# Patient Record
Sex: MALE | Race: WHITE | Employment: OTHER | URBAN - METROPOLITAN AREA
[De-identification: names, ages, dates, MRNs, and addresses within clinical notes are randomized per-mention and may not be internally consistent; named-entity substitution may affect disease eponyms.]

---

## 2017-01-16 ENCOUNTER — APPOINTMENT (OUTPATIENT)
Dept: LAB | Age: 62
End: 2017-01-16
Payer: COMMERCIAL

## 2017-01-16 DIAGNOSIS — Z98.890 HX OF COLONOSCOPY: ICD-10-CM

## 2017-01-18 ENCOUNTER — ANESTHESIA EVENT (OUTPATIENT)
Dept: ENDOSCOPY | Facility: HOSPITAL | Age: 62
End: 2017-01-18
Payer: COMMERCIAL

## 2017-01-18 LAB
P-R INTERVAL: 472 MS
Q-T INTERVAL: 472 MS
QRS DURATION: 198 MS
QTC CALCULATION (BEZET): 472 MS
R AXIS: -75 DEGREES
T AXIS: 80 DEGREES
VENTRICULAR RATE: 60 BPM

## 2017-01-18 PROCEDURE — 93005 ELECTROCARDIOGRAM TRACING: CPT | Performed by: INTERNAL MEDICINE

## 2017-01-18 PROCEDURE — 93010 ELECTROCARDIOGRAM REPORT: CPT | Performed by: INTERNAL MEDICINE

## 2017-01-19 ENCOUNTER — SURGERY (OUTPATIENT)
Age: 62
End: 2017-01-19

## 2017-01-19 ENCOUNTER — HOSPITAL ENCOUNTER (OUTPATIENT)
Facility: HOSPITAL | Age: 62
Setting detail: HOSPITAL OUTPATIENT SURGERY
Discharge: HOME | End: 2017-01-19
Attending: INTERNAL MEDICINE | Admitting: INTERNAL MEDICINE
Payer: COMMERCIAL

## 2017-01-19 ENCOUNTER — ANESTHESIA (OUTPATIENT)
Dept: ENDOSCOPY | Facility: HOSPITAL | Age: 62
End: 2017-01-19
Payer: COMMERCIAL

## 2017-01-19 VITALS
SYSTOLIC BLOOD PRESSURE: 121 MMHG | HEIGHT: 69 IN | WEIGHT: 255 LBS | OXYGEN SATURATION: 98 % | HEART RATE: 59 BPM | RESPIRATION RATE: 13 BRPM | TEMPERATURE: 98 F | DIASTOLIC BLOOD PRESSURE: 78 MMHG | BODY MASS INDEX: 37.77 KG/M2

## 2017-01-19 DIAGNOSIS — Z98.890 HX OF COLONOSCOPY: Primary | ICD-10-CM

## 2017-01-19 LAB — INR: 1.3 (ref 0.8–1.3)

## 2017-01-19 PROCEDURE — 85610 PROTHROMBIN TIME: CPT | Performed by: INTERNAL MEDICINE

## 2017-01-19 PROCEDURE — 88305 TISSUE EXAM BY PATHOLOGIST: CPT | Performed by: INTERNAL MEDICINE

## 2017-01-19 PROCEDURE — 0DBL8ZX EXCISION OF TRANSVERSE COLON, VIA NATURAL OR ARTIFICIAL OPENING ENDOSCOPIC, DIAGNOSTIC: ICD-10-PCS | Performed by: INTERNAL MEDICINE

## 2017-01-19 RX ORDER — NALOXONE HYDROCHLORIDE 0.4 MG/ML
80 INJECTION, SOLUTION INTRAMUSCULAR; INTRAVENOUS; SUBCUTANEOUS AS NEEDED
Status: DISCONTINUED | OUTPATIENT
Start: 2017-01-19 | End: 2017-01-19

## 2017-01-19 RX ORDER — SODIUM CHLORIDE, SODIUM LACTATE, POTASSIUM CHLORIDE, CALCIUM CHLORIDE 600; 310; 30; 20 MG/100ML; MG/100ML; MG/100ML; MG/100ML
INJECTION, SOLUTION INTRAVENOUS CONTINUOUS
Status: DISCONTINUED | OUTPATIENT
Start: 2017-01-19 | End: 2017-01-19

## 2017-01-19 RX ORDER — ONDANSETRON 2 MG/ML
4 INJECTION INTRAMUSCULAR; INTRAVENOUS AS NEEDED
Status: DISCONTINUED | OUTPATIENT
Start: 2017-01-19 | End: 2017-01-19

## 2017-01-19 NOTE — ANESTHESIA POSTPROCEDURE EVALUATION
[id number] [facility] Patient Status:  Hospital Outpatient Surgery   Age/Gender 57year old male MRN [id number]   Location [address] [place] [address]. Attending Tuan Pina MD   Hosp Day # 0 PCP Eder Adrian MD       Anesthesia Post-op No

## 2017-01-19 NOTE — ANESTHESIA PREPROCEDURE EVALUATION
PRE-OP EVALUATION    Patient Name: Jorje Daly    Pre-op Diagnosis: RECALL COLONOSCOPY      Procedure(s):  COLONOSCOPY      Surgeon(s) and Role:     * Tawnya Johnson MD - Primary    Pre-op vitals reviewed. Current medications reviewed. syndrome)  (+) valvular problems/murmurs and MR    (+) dysrhythmias and atrial fibrillation and PVC  (+) CHF                Endo/Other    Negative endo/other ROS.                               Pulmonary                    (+) sleep apnea and no treatment

## 2017-01-19 NOTE — H&P
[facility] Patient Status:  Hospital Outpatient Surgery    1955 MRN [id number]   [facility] ENDOSCOPY Attending Tsering Davis MD   Hosp Day # 0 PCP Orinda Carrel, MD     History of Pr after eating or when upset, automatic defibrillator, angina, other implanted devices, irregular heart rate/palpitations, high cholesterol or triglycerides, coronary stents.   Respiratory: Denies shortness of breath, chronic/frequent hoarseness, wheezing, ex normal S1S2, no murmur. Abdomen: soft, non-tender, non-distended, no masses, no guarding, no     rebound, positive BS. Extremity: no edema, no cyanosis   Skin: No rashes or lesions.     Neurological: Alert, interactive, no focal deficits    ASA Score: 3

## 2017-01-19 NOTE — OPERATIVE REPORT
Colon operative report  Patient Name: Roderick Flores  Procedure: Colonoscopy with snare polypectomy  Indication: Colon cancer screening   Date of last colonoscopy: The patient reports 7 years ago, and does not recall date, or findings.  He is presenting patient tolerated the procedure without apparent procedural complications. The patient left the procedure room in stable condition for recovery. Findings: There were internal hemorrhoids.   There were no masses, fissures, fistulae, or external hemorrhoid

## 2017-01-24 PROBLEM — Z45.018 PACEMAKER END OF LIFE: Status: ACTIVE | Noted: 2017-01-24

## 2017-01-30 ENCOUNTER — HOSPITAL ENCOUNTER (OUTPATIENT)
Dept: INTERVENTIONAL RADIOLOGY/VASCULAR | Facility: HOSPITAL | Age: 62
Discharge: HOME | End: 2017-01-30
Attending: INTERNAL MEDICINE | Admitting: INTERNAL MEDICINE
Payer: COMMERCIAL

## 2017-01-30 VITALS
OXYGEN SATURATION: 96 % | RESPIRATION RATE: 17 BRPM | HEART RATE: 66 BPM | HEIGHT: 69 IN | SYSTOLIC BLOOD PRESSURE: 124 MMHG | WEIGHT: 255 LBS | TEMPERATURE: 97 F | BODY MASS INDEX: 37.77 KG/M2 | DIASTOLIC BLOOD PRESSURE: 71 MMHG

## 2017-01-30 DIAGNOSIS — Z45.010 PACEMAKER AT END OF BATTERY LIFE: ICD-10-CM

## 2017-01-30 LAB — INR: 1 (ref 0.8–1.3)

## 2017-01-30 PROCEDURE — 0JPT0PZ REMOVAL OF CARDIAC RHYTHM RELATED DEVICE FROM TRUNK SUBCUTANEOUS TISSUE AND FASCIA, OPEN APPROACH: ICD-10-PCS | Performed by: INTERNAL MEDICINE

## 2017-01-30 PROCEDURE — 33227 REMOVE&REPLACE PM GEN SINGL: CPT

## 2017-01-30 PROCEDURE — 85610 PROTHROMBIN TIME: CPT

## 2017-01-30 PROCEDURE — 33207 INSERT HEART PM VENTRICULAR: CPT

## 2017-01-30 PROCEDURE — 0JH604Z INSERTION OF PACEMAKER, SINGLE CHAMBER INTO CHEST SUBCUTANEOUS TISSUE AND FASCIA, OPEN APPROACH: ICD-10-PCS | Performed by: INTERNAL MEDICINE

## 2017-01-30 PROCEDURE — 99152 MOD SED SAME PHYS/QHP 5/>YRS: CPT

## 2017-01-30 PROCEDURE — 99153 MOD SED SAME PHYS/QHP EA: CPT

## 2017-01-30 RX ORDER — BACITRACIN 50000 [USP'U]/1
INJECTION, POWDER, LYOPHILIZED, FOR SOLUTION INTRAMUSCULAR
Status: COMPLETED
Start: 2017-01-30 | End: 2017-01-30

## 2017-01-30 RX ORDER — ONDANSETRON 2 MG/ML
4 INJECTION INTRAMUSCULAR; INTRAVENOUS EVERY 6 HOURS PRN
Status: DISCONTINUED | OUTPATIENT
Start: 2017-01-30 | End: 2017-01-30

## 2017-01-30 RX ORDER — MIDAZOLAM HYDROCHLORIDE 1 MG/ML
INJECTION INTRAMUSCULAR; INTRAVENOUS
Status: COMPLETED
Start: 2017-01-30 | End: 2017-01-30

## 2017-01-30 RX ORDER — CEFADROXIL 500 MG/1
500 CAPSULE ORAL 2 TIMES DAILY
Qty: 10 CAPSULE | Refills: 0 | Status: SHIPPED | OUTPATIENT
Start: 2017-01-30 | End: 2017-02-04

## 2017-01-30 RX ORDER — SODIUM CHLORIDE 9 MG/ML
INJECTION, SOLUTION INTRAVENOUS CONTINUOUS
Status: DISCONTINUED | OUTPATIENT
Start: 2017-01-30 | End: 2017-01-30

## 2017-01-30 RX ORDER — CHLORHEXIDINE GLUCONATE 4 G/100ML
30 SOLUTION TOPICAL
Status: COMPLETED | OUTPATIENT
Start: 2017-01-31 | End: 2017-01-30

## 2017-01-30 RX ORDER — LIDOCAINE HYDROCHLORIDE 10 MG/ML
INJECTION, SOLUTION INFILTRATION; PERINEURAL
Status: COMPLETED
Start: 2017-01-30 | End: 2017-01-30

## 2017-01-30 RX ADMIN — SODIUM CHLORIDE: 9 INJECTION, SOLUTION INTRAVENOUS at 07:15:00

## 2017-01-30 RX ADMIN — CHLORHEXIDINE GLUCONATE 30 ML: 4 SOLUTION TOPICAL at 07:36:00

## 2017-01-30 NOTE — PROGRESS NOTES
Pre-procedure Note  Pt with chronic a fib and SSS with perm VVI pacemaker at [facility] for pulse generator exchange. Risk, benefits and alternatives explained to pt who wishes to proceed.

## 2017-01-30 NOTE — PROGRESS NOTES
Patient is s/p pacemaker generator change; a/o x 4; hemodynamically stable/neurologically intact; iv site removed intact; pacer site bandage c/d/i with no bleeding/hematoma noted; verbalized understanding of dc instructions/follow up; discharged in stable

## 2017-01-30 NOTE — PROCEDURES
[facility]    PATIENT'S NAME: Mallory Chew   ATTENDING PHYSICIAN: Elizabeth Browne M.D. OPERATING PHYSICIAN: Elizabeth Browne M.D.    PATIENT ACCOUNT#:   [de-identified]    LOCATION:  [facility] 2 EDWP 10  MEDICAL RECORD #:   [id number]       DATE OF BI medicated bandage. The patient returned to his room in stable and satisfactory condition. The patient received intravenous antibiotics and intravenous sedation monitored by myself. The patient received fentanyl 25 mg, Versed 2 mg x2.   Initial sedation w

## 2017-08-08 ENCOUNTER — APPOINTMENT (OUTPATIENT)
Dept: GENERAL RADIOLOGY | Facility: HOSPITAL | Age: 62
End: 2017-08-08
Attending: EMERGENCY MEDICINE
Payer: COMMERCIAL

## 2017-08-08 ENCOUNTER — HOSPITAL ENCOUNTER (EMERGENCY)
Facility: HOSPITAL | Age: 62
Discharge: HOME | End: 2017-08-08
Attending: EMERGENCY MEDICINE
Payer: COMMERCIAL

## 2017-08-08 VITALS
RESPIRATION RATE: 16 BRPM | OXYGEN SATURATION: 99 % | BODY MASS INDEX: 35.79 KG/M2 | DIASTOLIC BLOOD PRESSURE: 70 MMHG | TEMPERATURE: 99 F | SYSTOLIC BLOOD PRESSURE: 136 MMHG | HEIGHT: 70 IN | HEART RATE: 82 BPM | WEIGHT: 250 LBS

## 2017-08-08 DIAGNOSIS — K59.00 CONSTIPATION, UNSPECIFIED CONSTIPATION TYPE: Primary | ICD-10-CM

## 2017-08-08 PROCEDURE — 99283 EMERGENCY DEPT VISIT LOW MDM: CPT

## 2017-08-08 PROCEDURE — 82272 OCCULT BLD FECES 1-3 TESTS: CPT

## 2017-08-08 PROCEDURE — 74000 XR ABDOMEN (KUB) (1 AP VIEW)  (CPT=74000): CPT | Performed by: EMERGENCY MEDICINE

## 2017-08-08 RX ORDER — BISACODYL 10 MG
10 SUPPOSITORY, RECTAL RECTAL
Status: DISCONTINUED | OUTPATIENT
Start: 2017-08-08 | End: 2017-08-08

## 2017-08-08 RX ORDER — MAGNESIUM CARB/ALUMINUM HYDROX 105-160MG
296 TABLET,CHEWABLE ORAL ONCE
Status: COMPLETED | OUTPATIENT
Start: 2017-08-08 | End: 2017-08-08

## 2017-08-08 NOTE — ED NOTES
DC instructions handed to pt. No distress noted. FAmily at bedside. Pt thanks staff for care. Denies any further needs prior to DC. Water provided.

## 2017-08-08 NOTE — ED PROVIDER NOTES
Patient Seen in: [facility] Emergency Department    History   Patient presents with:  Constipation (gastrointestinal)    Stated Complaint: constipated, bloated    HPI    58-year-old male complaint of constipation the patient states his last problem elements reviewed from today and agreed except as otherwise stated in HPI.     Physical Exam   ED Triage Vitals [08/08/17 0822]  BP: 146/80  Pulse: 95  Resp: 18  Temp: 98.8 °F (37.1 °C)  Temp src: Temporal  SpO2: 97 %  O2 Device: None (Room air)    Current:

## 2017-08-08 NOTE — ED NOTES
Pt updated on poc. No distress noted. Pt denies any needs at this time.    Denies any BM at this time

## 2021-01-26 PROBLEM — M70.62 TROCHANTERIC BURSITIS, LEFT HIP: Status: ACTIVE | Noted: 2018-12-14

## 2021-01-26 PROBLEM — M25.552 PAIN IN JOINT OF LEFT HIP: Status: ACTIVE | Noted: 2018-12-14

## 2021-01-26 PROBLEM — E78.2 MIXED HYPERLIPIDEMIA: Status: ACTIVE | Noted: 2021-01-26

## 2021-04-11 DIAGNOSIS — Z23 NEED FOR VACCINATION: ICD-10-CM

## 2021-06-30 ENCOUNTER — ORDER TRANSCRIPTION (OUTPATIENT)
Dept: ADMINISTRATIVE | Facility: HOSPITAL | Age: 66
End: 2021-06-30

## 2021-06-30 DIAGNOSIS — Z79.01 ANTICOAGULATED ON COUMADIN: ICD-10-CM

## 2021-06-30 DIAGNOSIS — E78.00 HYPERCHOLESTEREMIA: ICD-10-CM

## 2021-06-30 DIAGNOSIS — Z95.0 PACEMAKER: ICD-10-CM

## 2021-06-30 DIAGNOSIS — I48.20 CHRONIC ATRIAL FIBRILLATION (HCC): Primary | ICD-10-CM

## 2021-07-17 ENCOUNTER — HOSPITAL ENCOUNTER (OUTPATIENT)
Dept: CT IMAGING | Facility: HOSPITAL | Age: 66
Discharge: HOME | End: 2021-07-17
Attending: INTERNAL MEDICINE

## 2021-07-17 DIAGNOSIS — E78.00 HYPERCHOLESTEREMIA: ICD-10-CM

## 2021-07-17 DIAGNOSIS — Z95.0 PACEMAKER: ICD-10-CM

## 2021-07-17 DIAGNOSIS — Z79.01 ANTICOAGULATED ON COUMADIN: ICD-10-CM

## 2021-07-17 DIAGNOSIS — I48.20 CHRONIC ATRIAL FIBRILLATION (HCC): ICD-10-CM

## 2021-07-17 DIAGNOSIS — Z13.6 SCREENING FOR CARDIOVASCULAR CONDITION: ICD-10-CM

## 2021-08-20 NOTE — PROGRESS NOTES
My chart message 7/30 and TE 8/6    Future Appointments  8/24/2021  1:30 PM    [facility]        [facility]              [address]  9/22/2021  2:15 PM     [address]

## 2024-01-31 NOTE — H&P (VIEW-ONLY)
HPI:   Chuck Goodrich is a 68 year old male who presents for a pre-operative physical exam. Patient is to have TURP (Saline), bladder Stone lithopaxy, Possible SP tube placement , to be done by Dr. Evin Sosa at [facility] on 2/15/24.      Pt complains of urinary issues, hematuria.    Revised Cardiac Risk Index (modified Lema Index):   High-risk surgical procedure (intraperitoneal, intrathoracic, suprainguinal vascular) No   History of MI or positive stress test, current chest pain secondary to myocardiac ischemia, current nitrate therapy, or EKG with pathologic Q wave No   History of CHF, pulmonary edema, PND, current rales, S3 gallop, chest xray with pulmonary vascular redistribution Yes   History of CVA/TIA No   Preoperative treatment with insulin No   Preoperative creatinine > 2.0 No   Score 1     Risk of major cardiac complication: moderate    Patient reports no:  Chest pain  Chest discomfort  Jaw pain  Jaw discomfort  Arm pain   Arm discomfort  Shortness of breath   Dyspnea on exertion  Paroxysmal nocturnal dyspnea  Orthopnea  Palpations  Edema    Previous hx of cardiac or vascular issues are CHF, Atrial fibrillation    Current Outpatient Medications   Medication Sig Dispense Refill   • fenofibrate 145 MG Oral Tab Take 1 tablet (145 mg total) by mouth daily. 90 tablet 3   • tamsulosin (Flomax) cap Take 1 capsule (0.4 mg total) by mouth 2 (two) times a day. 60 capsule 2   • levoFLOXacin (LEVAQUIN) 500 MG Oral Tab Take 1 tablet (500 mg total) by mouth daily. 7 tablet 0   • tamsulosin (Flomax) cap TAKE 1 CAPSULE BY MOUTH EVERY DAY 90 capsule 3   • verapamil  MG Oral Tab CR Take 1 tablet (240 mg total) by mouth 2 (two) times daily. 180 tablet 3   • metoprolol tartrate 100 MG Oral Tab TAKE 1 TABLET BY MOUTH every morning and AND 0.5 tablet every evening 135 tablet 3   • warfarin 2 MG Oral Tab Take 1-tablet (2mg) on Fridays and take 2-tablets (4mg) other 6-days of week or as directed 180  tablet 3   • Mirabegron ER (MYRBETRIQ) 25 MG Oral Tablet 24 Hr Take 1 tablet (25 mg total) by mouth daily. (Patient not taking: Reported on 1/31/2024) 30 tablet 3   • rosuvastatin (CRESTOR) 10 MG Oral Tab Take 1 tablet (10 mg total) by mouth daily. (Patient not taking: Reported on 1/31/2024) 90 tablet 3      Allergies: No Known Allergies   Past Medical History:   Diagnosis Date   • Atrial fibrillation (HCC)    • CHF (congestive heart failure) (HCC)    • Hypertension    • Long term (current) use of anticoagulants    • Mild mitral regurgitation    • ORI (obstructive sleep apnea)       Past Surgical History:   Procedure Laterality Date   • CARDIAC PACEMAKER PLACEMENT      [employer]   • COLONOSCOPY N/A 1/19/2017    Procedure: COLONOSCOPY;  Surgeon: Fidel Cuenca MD;  Location:  ENDOSCOPY   • OTHER SURGICAL HISTORY      Pacemaker      Family History   Problem Relation Age of Onset   • Cancer Father         Esophageal   • Other (Other) Mother         Heart Failer.      Social History: Social History    Tobacco Use      Smoking status: Never      Smokeless tobacco: Never    Alcohol use: Yes      Alcohol/week: 0.0 standard drinks of alcohol      Comment: 2-3 per week    Drug use: No         REVIEW OF SYSTEMS:   GENERAL: feels well otherwise  SKIN: denies any unusual skin lesions  EYES: denies blurred vision or double vision  HEENT: denies URI symptoms  LUNGS: see HPI  CARDIOVASCULAR: see HPI  GI: denies diarrhea  : hematuria  MUSCULOSKELETAL: good rom  PSYCHE: denies depression or anxiety  HEMATOLOGIC: denies hx of anemia  ENDOCRINE: denies thyroid history  ALL/ASTHMA: denies hx of allergy or asthma    EXAM:   There were no vitals taken for this visit.  GENERAL: well developed, well nourished, in no apparent distress  SKIN: no rashes, no suspicious lesions  HEENT: atraumatic, normocephalic, ears and throat are clear  EYES: PERRL, EOMI, normal conjunctiva, anicteric  NECK: supple, no adenopathy, no bruits  LUNGS:  clear to auscultation  CARDIO: RRR without murmur  GI: good BS's, no masses, HSM or tenderness  : deferred  MUSCULOSKELETAL: good rum  EXTREMITIES: no cyanosis, clubbing or edema  NEURO: A &O x 3, CN intact, motor and sensory are grossly intact    Ek bpm Afib w occ Ventricular paced complexes    ASSESSMENT AND PLAN:     Chuck Goodrich is a 68 year old male who presents for a pre-operative physical exam. Patient is to have TURP (Saline), bladder Stone lithopaxy, Possible SP tube placement , to be done by Dr. Evin Sosa at  on 2/15/24. Pt has the following conditions: chf, afib. Pt has  significant history of cardiac or pulmonary conditions, will need inhospital management of anticoagulation either through hospitalist or cardiology, will place referral for cardiology to decide on best approach.  Patient is cleared for surgery with the risks of the procedure and anesthesia as discussed with those specific specialists.    This consult was sent back the referring physician, Dr. Sosa.

## 2024-02-10 RX ORDER — FENOFIBRATE 145 MG/1
145 TABLET, COATED ORAL DAILY
COMMUNITY
Start: 2023-12-14 | End: 2024-12-08

## 2024-02-10 RX ORDER — TAMSULOSIN HYDROCHLORIDE 0.4 MG/1
1 CAPSULE ORAL DAILY
COMMUNITY
Start: 2023-04-05

## 2024-02-14 ENCOUNTER — ANESTHESIA EVENT (OUTPATIENT)
Dept: SURGERY | Facility: HOSPITAL | Age: 69
End: 2024-02-14
Payer: MEDICARE

## 2024-02-14 NOTE — DISCHARGE INSTRUCTIONS
HOME INSTRUCTIONS    Norco  for pain   Bactrim 2 x  a day for 5 days   Ditropan XL  10 mg a day as needed for Llamas discomfort   Drink at least 4-6 glasses of water a day  No strenuous activity   x 2 weeks   Avoid constipation   Follow up with Dr Sosa on Tuesday at  930  AM  [place] office -- will take both tubes out    Keep SP tube in abdomen capped    Llamas in penis  to leg bag when out of the house.  When in the house connect to the large night bag.      OK   to shower  Saturday morning     The medication that you received for sedation or general anesthesia can last up to 24 hours. Your judgment and reflexes may be altered, even if you feel like your normal self.      We Recommend:   Do not drive any motor vehicle or bicycle   Avoid mowing the lawn, playing sports, or working with power tools/applicances (power saws, electric knives or mixers)   That you have someone stay with you on your first night home   Do not drink alcohol or take sleeping pills or tranquilizers   Do not sign legal documents within 24 hours of your procedure   If you had a nerve block for your surgery, take extra care not to put any pressure on your arm or hand for 24 hours    It is normal:  For you to have a sore throat if you had a breathing tube during surgery (while you were asleep!). The sore throat should get better within 48 hours. You can gargle with warm salt water (1/2 tsp in 4 oz warm water) or use a throat lozenge for comfort  To feel muscle aches or soreness especially in the abdomen, chest or neck. The achy feeling should go away in the next 24 hours  To feel weak, sleepy or \"wiped out\". Your should start feeling better in the next 24 hours.   To experience mild discomforts such as sore lip or tongue, headache, cramps, gas pains or a bloated feeling in your abdomen.   To experience mild back pain or soreness for a day or two if you had spinal or epidural anesthesia.   If you had laparoscopic surgery, to feel shoulder pain or  discomfort on the day of surgery.   For some patients to have nausea after surgery/anesthesia    If you feel nausea or experience vomiting:   Try to move around less.   Eat less than usual or drink only liquids until the next morning   Nausea should resolve in about 24 hours    If you have a problem when you are at home:    Call your surgeons office   Discharge Instructions: After Your Surgery  You’ve just had surgery. During surgery, you were given medicine called anesthesia to keep you relaxed and free of pain. After surgery, you may have some pain or nausea. This is common. Here are some tips for feeling better and getting well after surgery.   Going home  Your healthcare provider will show you how to take care of yourself when you go home. They'll also answer your questions. Have an adult family member or friend drive you home. For the first 24 hours after your surgery:   Don't drive or use heavy equipment.  Don't make important decisions or sign legal papers.  Take medicines as directed.  Don't drink alcohol.  Have someone stay with you, if needed. They can watch for problems and help keep you safe.  Be sure to go to all follow-up visits with your healthcare provider. And rest after your surgery for as long as your provider tells you to.   Coping with pain  If you have pain after surgery, pain medicine will help you feel better. Take it as directed, before pain becomes severe. Also, ask your healthcare provider or pharmacist about other ways to control pain. This might be with heat, ice, or relaxation. And follow any other instructions your surgeon or nurse gives you.      Stay on schedule with your medicine.     Tips for taking pain medicine  To get the best relief possible, remember these points:   Pain medicines can upset your stomach. Taking them with a little food may help.  Most pain relievers taken by mouth need at least 20 to 30 minutes to start to work.  Don't wait till your pain becomes severe before  you take your medicine. Try to time your medicine so that you can take it before starting an activity. This might be before you get dressed, go for a walk, or sit down for dinner.  Constipation is a common side effect of some pain medicines. Call your healthcare provider before taking any medicines such as laxatives or stool softeners to help ease constipation. Also ask if you should skip any foods. Drinking lots of fluids and eating foods such as fruits and vegetables that are high in fiber can also help. Remember, don't take laxatives unless your surgeon has prescribed them.  Drinking alcohol and taking pain medicine can cause dizziness and slow your breathing. It can even be deadly. Don't drink alcohol while taking pain medicine.  Pain medicine can make you react more slowly to things. Don't drive or run machinery while taking pain medicine.  Your healthcare provider may tell you to take acetaminophen to help ease your pain. Ask them how much you're supposed to take each day. Acetaminophen or other pain relievers may interact with your prescription medicines or other over-the-counter (OTC) medicines. Some prescription medicines have acetaminophen and other ingredients in them. Using both prescription and OTC acetaminophen for pain can cause you to accidentally overdose. Read the labels on your OTC medicines with care. This will help you to clearly know the list of ingredients, how much to take, and any warnings. It may also help you not take too much acetaminophen. If you have questions or don't understand the information, ask your pharmacist or healthcare provider to explain it to you before you take the OTC medicine.   Managing nausea  Some people have an upset stomach (nausea) after surgery. This is often because of anesthesia, pain, or pain medicine, less movement of food in the stomach, or the stress of surgery. These tips will help you handle nausea and eat healthy foods as you get better. If you were on a  special food plan before surgery, ask your healthcare provider if you should follow it while you get better. Check with your provider on how your eating should progress. It may depend on the surgery you had. These general tips may help:   Don't push yourself to eat. Your body will tell you when to eat and how much.  Start off with clear liquids and soup. They're easier to digest.  Next try semi-solid foods as you feel ready. These include mashed potatoes, applesauce, and gelatin.  Slowly move to solid foods. Don’t eat fatty, rich, or spicy foods at first.  Don't force yourself to have 3 large meals a day. Instead eat smaller amounts more often.  Take pain medicines with a small amount of solid food, such as crackers or toast. This helps prevent nausea.  When to call your healthcare provider  Call your healthcare provider right away if you have any of these:   You still have too much pain, or the pain gets worse, after taking the medicine. The medicine may not be strong enough. Or there may be a complication from the surgery.  You feel too sleepy, dizzy, or groggy. The medicine may be too strong.  Side effects such as nausea or vomiting. Your healthcare provider may advise taking other medicines to .  Skin changes such as rash, itching, or hives. This may mean you have an allergic reaction. Your provider may advise taking other medicines.  The incision looks different (for instance, part of it opens up).  Bleeding or fluid leaking from the incision site, and weren't told to expect that.  Fever of 100.4°F (38°C) or higher, or as directed by your provider.  Call 911  Call 911 right away if you have:   Trouble breathing  Facial swelling    If you have obstructive sleep apnea   You were given anesthesia medicine during surgery to keep you comfortable and free of pain. After surgery, you may have more apnea spells because of this medicine and other medicines you were given. The spells may last longer than normal.    At  home:  Keep using the continuous positive airway pressure (CPAP) device when you sleep. Unless your healthcare provider tells you not to, use it when you sleep, day or night. CPAP is a common device used to treat obstructive sleep apnea.  Talk with your provider before taking any pain medicine, muscle relaxants, or sedatives. Your provider will tell you about the possible dangers of taking these medicines.  Contact your provider if your sleeping changes a lot even when taking medicines as directed.  [employer] last reviewed this educational content on 10/1/2021  © 4201-0243 [employer]. All rights reserved. This information is not intended as a substitute for professional medical care. Always follow your healthcare professional's instructions.       HOME INSTRUCTIONS  [facility] HOME CARE INSTRUCTIONS: POST-OP ANESTHESIA  The medication that you received for sedation or general anesthesia can last up to 24 hours. Your judgment and reflexes may be altered, even if you feel like your normal self.      We Recommend:   Do not drive any motor vehicle or bicycle   Avoid mowing the lawn, playing sports, or working with power tools/applicances (power saws, electric knives or mixers)   That you have someone stay with you on your first night home   Do not drink alcohol or take sleeping pills or tranquilizers   Do not sign legal documents within 24 hours of your procedure   If you had a nerve block for your surgery, take extra care not to put any pressure on your arm or hand for 24 hours    It is normal:  For you to have a sore throat if you had a breathing tube during surgery (while you were asleep!). The sore throat should get better within 48 hours. You can gargle with warm salt water (1/2 tsp in 4 oz warm water) or use a throat lozenge for comfort  To feel muscle aches or soreness especially in the abdomen, chest or neck. The achy feeling should go away in the next 24 hours  To feel weak, sleepy or \"wiped out\".  Your should start feeling better in the next 24 hours.   To experience mild discomforts such as sore lip or tongue, headache, cramps, gas pains or a bloated feeling in your abdomen.   To experience mild back pain or soreness for a day or two if you had spinal or epidural anesthesia.   If you had laparoscopic surgery, to feel shoulder pain or discomfort on the day of surgery.   For some patients to have nausea after surgery/anesthesia    If you feel nausea or experience vomiting:   Try to move around less.   Eat less than usual or drink only liquids until the next morning   Nausea should resolve in about 24 hours    If you have a problem when you are at home:    Call your surgeons office   Discharge Instructions: After Your Surgery  You’ve just had surgery. During surgery, you were given medicine called anesthesia to keep you relaxed and free of pain. After surgery, you may have some pain or nausea. This is common. Here are some tips for feeling better and getting well after surgery.   Going home  Your healthcare provider will show you how to take care of yourself when you go home. They'll also answer your questions. Have an adult family member or friend drive you home. For the first 24 hours after your surgery:   Don't drive or use heavy equipment.  Don't make important decisions or sign legal papers.  Take medicines as directed.  Don't drink alcohol.  Have someone stay with you, if needed. They can watch for problems and help keep you safe.  Be sure to go to all follow-up visits with your healthcare provider. And rest after your surgery for as long as your provider tells you to.   Coping with pain  If you have pain after surgery, pain medicine will help you feel better. Take it as directed, before pain becomes severe. Also, ask your healthcare provider or pharmacist about other ways to control pain. This might be with heat, ice, or relaxation. And follow any other instructions your surgeon or nurse gives you.       Stay on schedule with your medicine.     Tips for taking pain medicine  To get the best relief possible, remember these points:   Pain medicines can upset your stomach. Taking them with a little food may help.  Most pain relievers taken by mouth need at least 20 to 30 minutes to start to work.  Don't wait till your pain becomes severe before you take your medicine. Try to time your medicine so that you can take it before starting an activity. This might be before you get dressed, go for a walk, or sit down for dinner.  Constipation is a common side effect of some pain medicines. Call your healthcare provider before taking any medicines such as laxatives or stool softeners to help ease constipation. Also ask if you should skip any foods. Drinking lots of fluids and eating foods such as fruits and vegetables that are high in fiber can also help. Remember, don't take laxatives unless your surgeon has prescribed them.  Drinking alcohol and taking pain medicine can cause dizziness and slow your breathing. It can even be deadly. Don't drink alcohol while taking pain medicine.  Pain medicine can make you react more slowly to things. Don't drive or run machinery while taking pain medicine.  Your healthcare provider may tell you to take acetaminophen to help ease your pain. Ask them how much you're supposed to take each day. Acetaminophen or other pain relievers may interact with your prescription medicines or other over-the-counter (OTC) medicines. Some prescription medicines have acetaminophen and other ingredients in them. Using both prescription and OTC acetaminophen for pain can cause you to accidentally overdose. Read the labels on your OTC medicines with care. This will help you to clearly know the list of ingredients, how much to take, and any warnings. It may also help you not take too much acetaminophen. If you have questions or don't understand the information, ask your pharmacist or healthcare provider to  explain it to you before you take the OTC medicine.   Managing nausea  Some people have an upset stomach (nausea) after surgery. This is often because of anesthesia, pain, or pain medicine, less movement of food in the stomach, or the stress of surgery. These tips will help you handle nausea and eat healthy foods as you get better. If you were on a special food plan before surgery, ask your healthcare provider if you should follow it while you get better. Check with your provider on how your eating should progress. It may depend on the surgery you had. These general tips may help:   Don't push yourself to eat. Your body will tell you when to eat and how much.  Start off with clear liquids and soup. They're easier to digest.  Next try semi-solid foods as you feel ready. These include mashed potatoes, applesauce, and gelatin.  Slowly move to solid foods. Don’t eat fatty, rich, or spicy foods at first.  Don't force yourself to have 3 large meals a day. Instead eat smaller amounts more often.  Take pain medicines with a small amount of solid food, such as crackers or toast. This helps prevent nausea.  When to call your healthcare provider  Call your healthcare provider right away if you have any of these:   You still have too much pain, or the pain gets worse, after taking the medicine. The medicine may not be strong enough. Or there may be a complication from the surgery.  You feel too sleepy, dizzy, or groggy. The medicine may be too strong.  Side effects such as nausea or vomiting. Your healthcare provider may advise taking other medicines to .  Skin changes such as rash, itching, or hives. This may mean you have an allergic reaction. Your provider may advise taking other medicines.  The incision looks different (for instance, part of it opens up).  Bleeding or fluid leaking from the incision site, and weren't told to expect that.  Fever of 100.4°F (38°C) or higher, or as directed by your provider.  Call 911  Call  911 right away if you have:   Trouble breathing  Facial swelling    If you have obstructive sleep apnea   You were given anesthesia medicine during surgery to keep you comfortable and free of pain. After surgery, you may have more apnea spells because of this medicine and other medicines you were given. The spells may last longer than normal.    At home:  Keep using the continuous positive airway pressure (CPAP) device when you sleep. Unless your healthcare provider tells you not to, use it when you sleep, day or night. CPAP is a common device used to treat obstructive sleep apnea.  Talk with your provider before taking any pain medicine, muscle relaxants, or sedatives. Your provider will tell you about the possible dangers of taking these medicines.  Contact your provider if your sleeping changes a lot even when taking medicines as directed.  [employer] last reviewed this educational content on 10/1/2021  © 3899-4038 [employer]. All rights reserved. This information is not intended as a substitute for professional medical care. Always follow your healthcare professional's instructions.        Discharge Instructions: Caring for Your Indwelling Urinary Catheter  You have been discharged with an indwelling urinary catheter. It's also called a Llamas catheter. A catheter is a thin, flexible tube. An indwelling urinary catheter has 2 parts. The first part is a tube that drains urine from your bladder. The second part is a bag or other device that collects the urine.   The most important thing to remember is that you want to prevent infection. Always wash your hands before handling your catheter bag or tubing.   Draining the bedside bag  Wash your hands with soap and clean, running water. Or use an alcohol-based hand  that contains at least 60% alcohol.  Hold the drainage tube over a toilet or measuring container.  Unclamp the tube and let the bag drain.  Don’t touch the tip of the drainage tube or  let it touch the toilet or container.  You don't need to rinse the bag or drainage tube.    Cleaning the drainage tube  When the bag is empty, clean the tip of the drainage tube with an alcohol wipe.  Clamp the tube.  Reinsert the tube into the pocket on the drainage bag.    Cleaning your skin and tubing  Clean the skin near the catheter with soap and water.  Wash your genital area from front to back.  Wash the catheter tubing. Always wash the catheter in the direction away from your body.  You will be told when and how to change your bag and tubing.  Don’t try to remove the catheter by yourself.  You may shower with the catheter in place.    Emptying a leg bag  Wash your hands.  Remove the stopper on the bag.  Drain the bag into the toilet or a measuring container. Don’t let the tip of the drainage tube touch anything, including your fingers.  Clean the tip of the drainage tube with alcohol.  Replace the stopper.    Follow-up care  Make a follow-up appointment, or as directed by your healthcare provider.   When to call your healthcare provider  Call your healthcare provider right away if you have any of the following:   Fever of 100.4°F ( 38°C) or higher, or as directed by your provider  Chills  Leakage around the catheter insertion site  Increased spasms (uncontrollable twitching) in your legs, belly (abdomen), or bladder. Occasional mild spasms are normal.  Burning in the urinary tract, penis, or genital area  Nausea and vomiting  Aching in the lower back  Cloudy or bloody (pink or red) urine, sediment or mucus in the urine, or bad-smelling urine  [employer] last reviewed this educational content on 9/1/2022 © 2000-2023 [employer]. All rights reserved. This information is not intended as a substitute for professional medical care. Always follow your healthcare professional's instructions.

## 2024-02-15 ENCOUNTER — APPOINTMENT (OUTPATIENT)
Dept: GENERAL RADIOLOGY | Facility: HOSPITAL | Age: 69
End: 2024-02-15
Attending: UROLOGY
Payer: MEDICARE

## 2024-02-15 ENCOUNTER — HOSPITAL ENCOUNTER (OUTPATIENT)
Facility: HOSPITAL | Age: 69
Setting detail: HOSPITAL OUTPATIENT SURGERY
Discharge: HOME | End: 2024-02-15
Attending: UROLOGY | Admitting: UROLOGY
Payer: MEDICARE

## 2024-02-15 ENCOUNTER — ANESTHESIA (OUTPATIENT)
Dept: SURGERY | Facility: HOSPITAL | Age: 69
End: 2024-02-15
Payer: MEDICARE

## 2024-02-15 VITALS
OXYGEN SATURATION: 96 % | SYSTOLIC BLOOD PRESSURE: 122 MMHG | WEIGHT: 284 LBS | BODY MASS INDEX: 42.06 KG/M2 | HEIGHT: 69 IN | DIASTOLIC BLOOD PRESSURE: 76 MMHG | RESPIRATION RATE: 16 BRPM | TEMPERATURE: 98 F | HEART RATE: 66 BPM

## 2024-02-15 DIAGNOSIS — N21.0 BLADDER STONE: Primary | ICD-10-CM

## 2024-02-15 LAB
INR BLD: 1.03 (ref 0.8–1.2)
PROTHROMBIN TIME: 14.1 SECONDS (ref 11.6–14.8)

## 2024-02-15 PROCEDURE — 82365 CALCULUS SPECTROSCOPY: CPT | Performed by: UROLOGY

## 2024-02-15 PROCEDURE — 0TCB3ZZ EXTIRPATION OF MATTER FROM BLADDER, PERCUTANEOUS APPROACH: ICD-10-PCS | Performed by: UROLOGY

## 2024-02-15 PROCEDURE — 0VB08ZX EXCISION OF PROSTATE, VIA NATURAL OR ARTIFICIAL OPENING ENDOSCOPIC, DIAGNOSTIC: ICD-10-PCS | Performed by: UROLOGY

## 2024-02-15 PROCEDURE — 85610 PROTHROMBIN TIME: CPT | Performed by: UROLOGY

## 2024-02-15 PROCEDURE — 88300 SURGICAL PATH GROSS: CPT | Performed by: UROLOGY

## 2024-02-15 PROCEDURE — 88305 TISSUE EXAM BY PATHOLOGIST: CPT | Performed by: UROLOGY

## 2024-02-15 RX ORDER — HYDROCODONE BITARTRATE AND ACETAMINOPHEN 5; 325 MG/1; MG/1
1-2 TABLET ORAL EVERY 4 HOURS PRN
Qty: 15 TABLET | Refills: 0 | Status: SHIPPED | OUTPATIENT
Start: 2024-02-15

## 2024-02-15 RX ORDER — FAMOTIDINE 10 MG/ML
20 INJECTION, SOLUTION INTRAVENOUS ONCE
Status: COMPLETED | OUTPATIENT
Start: 2024-02-15 | End: 2024-02-15

## 2024-02-15 RX ORDER — SODIUM CHLORIDE, SODIUM LACTATE, POTASSIUM CHLORIDE, CALCIUM CHLORIDE 600; 310; 30; 20 MG/100ML; MG/100ML; MG/100ML; MG/100ML
INJECTION, SOLUTION INTRAVENOUS CONTINUOUS
Status: DISCONTINUED | OUTPATIENT
Start: 2024-02-15 | End: 2024-02-15

## 2024-02-15 RX ORDER — METOCLOPRAMIDE HYDROCHLORIDE 5 MG/ML
10 INJECTION INTRAMUSCULAR; INTRAVENOUS EVERY 8 HOURS PRN
Status: DISCONTINUED | OUTPATIENT
Start: 2024-02-15 | End: 2024-02-15

## 2024-02-15 RX ORDER — FAMOTIDINE 20 MG/1
20 TABLET, FILM COATED ORAL ONCE
Status: COMPLETED | OUTPATIENT
Start: 2024-02-15 | End: 2024-02-15

## 2024-02-15 RX ORDER — MORPHINE SULFATE 10 MG/ML
6 INJECTION, SOLUTION INTRAMUSCULAR; INTRAVENOUS EVERY 10 MIN PRN
Status: DISCONTINUED | OUTPATIENT
Start: 2024-02-15 | End: 2024-02-15

## 2024-02-15 RX ORDER — MORPHINE SULFATE 4 MG/ML
2 INJECTION, SOLUTION INTRAMUSCULAR; INTRAVENOUS EVERY 10 MIN PRN
Status: DISCONTINUED | OUTPATIENT
Start: 2024-02-15 | End: 2024-02-15

## 2024-02-15 RX ORDER — SULFAMETHOXAZOLE AND TRIMETHOPRIM 800; 160 MG/1; MG/1
1 TABLET ORAL 2 TIMES DAILY
Qty: 14 TABLET | Refills: 0 | Status: SHIPPED | OUTPATIENT
Start: 2024-02-15 | End: 2024-02-22

## 2024-02-15 RX ORDER — MORPHINE SULFATE 4 MG/ML
4 INJECTION, SOLUTION INTRAMUSCULAR; INTRAVENOUS EVERY 10 MIN PRN
Status: DISCONTINUED | OUTPATIENT
Start: 2024-02-15 | End: 2024-02-15

## 2024-02-15 RX ORDER — METOCLOPRAMIDE 10 MG/1
10 TABLET ORAL ONCE
Status: COMPLETED | OUTPATIENT
Start: 2024-02-15 | End: 2024-02-15

## 2024-02-15 RX ORDER — CEFAZOLIN SODIUM/WATER 2 G/20 ML
2 SYRINGE (ML) INTRAVENOUS ONCE
Status: COMPLETED | OUTPATIENT
Start: 2024-02-15 | End: 2024-02-15

## 2024-02-15 RX ORDER — ONDANSETRON 2 MG/ML
4 INJECTION INTRAMUSCULAR; INTRAVENOUS EVERY 6 HOURS PRN
Status: DISCONTINUED | OUTPATIENT
Start: 2024-02-15 | End: 2024-02-15

## 2024-02-15 RX ORDER — ACETAMINOPHEN 500 MG
1000 TABLET ORAL ONCE
Status: COMPLETED | OUTPATIENT
Start: 2024-02-15 | End: 2024-02-15

## 2024-02-15 RX ORDER — HYDROMORPHONE HYDROCHLORIDE 1 MG/ML
0.2 INJECTION, SOLUTION INTRAMUSCULAR; INTRAVENOUS; SUBCUTANEOUS EVERY 5 MIN PRN
Status: DISCONTINUED | OUTPATIENT
Start: 2024-02-15 | End: 2024-02-15

## 2024-02-15 RX ORDER — HYDROMORPHONE HYDROCHLORIDE 1 MG/ML
0.6 INJECTION, SOLUTION INTRAMUSCULAR; INTRAVENOUS; SUBCUTANEOUS EVERY 5 MIN PRN
Status: DISCONTINUED | OUTPATIENT
Start: 2024-02-15 | End: 2024-02-15

## 2024-02-15 RX ORDER — OXYBUTYNIN CHLORIDE 10 MG/1
10 TABLET, EXTENDED RELEASE ORAL DAILY PRN
Qty: 5 TABLET | Refills: 0 | Status: SHIPPED | OUTPATIENT
Start: 2024-02-15 | End: 2024-02-20

## 2024-02-15 RX ORDER — LIDOCAINE HYDROCHLORIDE 10 MG/ML
INJECTION, SOLUTION EPIDURAL; INFILTRATION; INTRACAUDAL; PERINEURAL AS NEEDED
Status: DISCONTINUED | OUTPATIENT
Start: 2024-02-15 | End: 2024-02-15 | Stop reason: SURG

## 2024-02-15 RX ORDER — ROCURONIUM BROMIDE 10 MG/ML
INJECTION, SOLUTION INTRAVENOUS AS NEEDED
Status: DISCONTINUED | OUTPATIENT
Start: 2024-02-15 | End: 2024-02-15 | Stop reason: SURG

## 2024-02-15 RX ORDER — PHENYLEPHRINE HCL 10 MG/ML
VIAL (ML) INJECTION AS NEEDED
Status: DISCONTINUED | OUTPATIENT
Start: 2024-02-15 | End: 2024-02-15 | Stop reason: SURG

## 2024-02-15 RX ORDER — HYDROMORPHONE HYDROCHLORIDE 1 MG/ML
0.4 INJECTION, SOLUTION INTRAMUSCULAR; INTRAVENOUS; SUBCUTANEOUS EVERY 5 MIN PRN
Status: DISCONTINUED | OUTPATIENT
Start: 2024-02-15 | End: 2024-02-15

## 2024-02-15 RX ORDER — EPHEDRINE SULFATE 50 MG/ML
INJECTION, SOLUTION INTRAVENOUS AS NEEDED
Status: DISCONTINUED | OUTPATIENT
Start: 2024-02-15 | End: 2024-02-15 | Stop reason: SURG

## 2024-02-15 RX ORDER — DEXAMETHASONE SODIUM PHOSPHATE 4 MG/ML
VIAL (ML) INJECTION AS NEEDED
Status: DISCONTINUED | OUTPATIENT
Start: 2024-02-15 | End: 2024-02-15 | Stop reason: SURG

## 2024-02-15 RX ORDER — METOCLOPRAMIDE HYDROCHLORIDE 5 MG/ML
10 INJECTION INTRAMUSCULAR; INTRAVENOUS ONCE
Status: COMPLETED | OUTPATIENT
Start: 2024-02-15 | End: 2024-02-15

## 2024-02-15 RX ORDER — NALOXONE HYDROCHLORIDE 0.4 MG/ML
0.08 INJECTION, SOLUTION INTRAMUSCULAR; INTRAVENOUS; SUBCUTANEOUS AS NEEDED
Status: DISCONTINUED | OUTPATIENT
Start: 2024-02-15 | End: 2024-02-15

## 2024-02-15 RX ORDER — ONDANSETRON 2 MG/ML
INJECTION INTRAMUSCULAR; INTRAVENOUS AS NEEDED
Status: DISCONTINUED | OUTPATIENT
Start: 2024-02-15 | End: 2024-02-15 | Stop reason: SURG

## 2024-02-15 RX ORDER — HYDROCODONE BITARTRATE AND ACETAMINOPHEN 5; 325 MG/1; MG/1
1 TABLET ORAL ONCE AS NEEDED
Status: COMPLETED | OUTPATIENT
Start: 2024-02-15 | End: 2024-02-15

## 2024-02-15 RX ADMIN — LIDOCAINE HYDROCHLORIDE 50 MG: 10 INJECTION, SOLUTION EPIDURAL; INFILTRATION; INTRACAUDAL; PERINEURAL at 09:01:00

## 2024-02-15 RX ADMIN — ROCURONIUM BROMIDE 30 MG: 10 INJECTION, SOLUTION INTRAVENOUS at 10:09:00

## 2024-02-15 RX ADMIN — ONDANSETRON 4 MG: 2 INJECTION INTRAMUSCULAR; INTRAVENOUS at 10:54:00

## 2024-02-15 RX ADMIN — CEFAZOLIN SODIUM/WATER 2 G: 2 G/20 ML SYRINGE (ML) INTRAVENOUS at 09:10:00

## 2024-02-15 RX ADMIN — EPHEDRINE SULFATE 5 MG: 50 INJECTION, SOLUTION INTRAVENOUS at 10:02:00

## 2024-02-15 RX ADMIN — ROCURONIUM BROMIDE 70 MG: 10 INJECTION, SOLUTION INTRAVENOUS at 09:01:00

## 2024-02-15 RX ADMIN — ROCURONIUM BROMIDE 20 MG: 10 INJECTION, SOLUTION INTRAVENOUS at 10:43:00

## 2024-02-15 RX ADMIN — SODIUM CHLORIDE, SODIUM LACTATE, POTASSIUM CHLORIDE, CALCIUM CHLORIDE: 600; 310; 30; 20 INJECTION, SOLUTION INTRAVENOUS at 08:53:00

## 2024-02-15 RX ADMIN — PHENYLEPHRINE HCL 100 MCG: 10 MG/ML VIAL (ML) INJECTION at 10:02:00

## 2024-02-15 RX ADMIN — DEXAMETHASONE SODIUM PHOSPHATE 4 MG: 4 MG/ML VIAL (ML) INJECTION at 09:25:00

## 2024-02-15 RX ADMIN — PHENYLEPHRINE HCL 100 MCG: 10 MG/ML VIAL (ML) INJECTION at 11:02:00

## 2024-02-15 RX ADMIN — PHENYLEPHRINE HCL 100 MCG: 10 MG/ML VIAL (ML) INJECTION at 09:54:00

## 2024-02-15 RX ADMIN — PHENYLEPHRINE HCL 100 MCG: 10 MG/ML VIAL (ML) INJECTION at 09:44:00

## 2024-02-15 NOTE — OPERATIVE REPORT
Pre-Operative Diagnosis: Gross hematuria, BPH with obstruction, and urinary tract symptoms, bladder stones     Post-Operative Diagnosis: Gross hematuria, BPH with obstruction, and urinary tract symptoms, bladder stones      Procedure Performed:   Percutaneous   Cystolitholopaxy, transurethral resection of the prostate, suprapubic catheter placement  ( Bladder Stone  5 cm)   Surgeon(s) and Role:     * Evin Sosa MD - Primary    Assistant(s):  None       Surgical Findings: Large Hard Bladder stone      Specimen: Prostate chips      Estimated Blood Loss: Blood Output: 100 mL (2/15/2024 11:20 AM)    Dictation Number: The patient was well identified in the operating table.  He was draped and prepped in usual sterile fashion lithotomy position.  A standard timeout procedure was performed he received IV Ancef and Venodyne's with all within an hour of surgery.  Initially we performed rigid cystoscopy and this noted a 5 cm bladder stone yellow-white in color.  We filled the bladder to capacity at least 300 cc and placed him in steep Trendelenburg and then 2 fingerbreadths above the pubic bone we placed 18-gauge spinal needle into the bladder.  We then used NephroMax 30 [place] sheath to dilate up tract and made a 1-1/2 cm incision in the skin.  We then placed the sheath into bladder and were able to look directly and with nephroscope.  We could see the stone very easily and then we used trilogy lithotrite to break up the stone using both pneumatic and ultrasonic lithotripsy.  The stone was very hard and it took a long time to break up the stone.  At the end of the case patient was essentially stone free as we had suctioned residual cyst in the bladder.  We then placed a 24 [place] three-way Llamas catheter through the suprapubic skin site so we could do continuous bladder irrigation.  We calibrated the urethra with Everett sounds to 28 [place]e and then placed a three-way Llamas catheter.  We we then placed 26  Scottish continuous-flow resectoscope into the bladder prostate was at least 4 cm in length.  We initially performed a standard transurethral resection using a button from the bladder neck to just proximal of the Varo.  We then used the resectoscope loop to smooth out the prostate and to get good hemostasis.  We then washed out the bladder and sent the chips to pathology.  We then put the loop back and then the button and then we got good fulguration and placed the 24 Scottish three-way Llamas catheter through the penis and 16 Scottish Llamas catheter there via the suprapubic tube site 10 cc in the balloon to tamponade the site we plug the suprapubic tube.  We then closed our incision with interrupted 3-0 Vicryl sutures and 4-0 Vicryl interrupted vertical mattress on the skin.  The bladder stones were sent to pathology as well as the prostatic chips.  The patient was transferred to the recovery room on [facility] but the urine was crystal clear.    Evin Sosa MD  2/15/2024  11:21 AM

## 2024-02-15 NOTE — ANESTHESIA PROCEDURE NOTES
Airway  Date/Time: 2/15/2024 9:03 AM  Urgency: elective    Airway not difficult    General Information and Staff    Patient location during procedure: OR  Anesthesiologist: Cyndy Temple MD  Performed: anesthesiologist   Performed by: Cyndy Temple MD  Authorized by: Cyndy Temple MD      Indications and Patient Condition  Indications for airway management: anesthesia  Spontaneous Ventilation: absent  Sedation level: deep  Preoxygenated: yes  Patient position: sniffing  Mask difficulty assessment: 1 - vent by mask  Planned trial extubation    Final Airway Details  Final airway type: endotracheal airway      Successful airway: ETT  Cuffed: yes   Successful intubation technique: Video laryngoscopy  Endotracheal tube insertion site: oral  Blade: GlideScope  Blade size: #4  ETT size (mm): 7.5    Cormack-Lehane Classification: grade I - full view of glottis  Placement verified by: capnometry   Measured from: lips  ETT to lips (cm): 22  Number of attempts at approach: 1  Number of other approaches attempted: 0

## 2024-02-15 NOTE — ANESTHESIA PREPROCEDURE EVALUATION
Anesthesia PreOp Note    HPI:     Chuck Goodrich is a 68 year old male who presents for preoperative consultation requested by: Evin Sosa MD    Date of Surgery: 2/15/2024    Procedure(s):  Cystolitholopaxy, transurethral resection of the prostate, possible suprapubic tube placement  LASER HOLMIUM LITHOTRIPSY  Indication: Gross hematuria, BPH with obstruction, and urinary tract symptoms, bladder stones    Relevant Problems   No relevant active problems       NPO:  Last Liquid Consumption Date: 02/15/24  Last Liquid Consumption Time: 0430 (sips of water with med)  Last Solid Consumption Date: 02/14/24  Last Solid Consumption Time: 1700  Last Liquid Consumption Date: 02/15/24          History Review:  Patient Active Problem List    Diagnosis Date Noted    Mixed hyperlipidemia 01/26/2021    Pain in joint of left hip 12/14/2018    Trochanteric bursitis, left hip 12/14/2018    Pacemaker end of life 01/24/2017    Pacemaker 12/14/2015    Monitoring for long-term anticoagulant use 11/09/2015    CHF (congestive heart failure) (HCC) 06/12/2014    Mitral regurgitation 06/12/2014    ORI (obstructive sleep apnea) 06/12/2014    Hypertension 06/12/2014    Long term current use of anticoagulant therapy 06/12/2014    Atrial fibrillation (HCC) 06/06/2014       Past Medical History:   Diagnosis Date    Arrhythmia     Atrial fibrillation (HCC)     CHF (congestive heart failure) ([facility])     High blood pressure     High cholesterol     Hypertension     Long term (current) use of anticoagulants     Mild mitral regurgitation     ORI (obstructive sleep apnea)     Osteoarthritis     Visual impairment        Past Surgical History:   Procedure Laterality Date    CARDIAC PACEMAKER PLACEMENT      [employer]tr[employer]    COLONOSCOPY N/A 01/19/2017    Procedure: COLONOSCOPY;  Surgeon: Fidel Cuenca MD;  Location:  ENDOSCOPY    COLONOSCOPY      EYE SURGERY      OTHER SURGICAL HISTORY      Pacemaker       Medications Prior to Admission    Medication Sig Dispense Refill Last Dose    tamsulosin 0.4 MG Oral Cap Take 1 capsule (0.4 mg total) by mouth daily.   2/14/2024 at 0800    fenofibrate 145 MG Oral Tab Take 1 tablet (145 mg total) by mouth daily.   2/14/2024 at 0800    atorvastatin 10 MG Oral Tab Take 1 tablet (10 mg total) by mouth daily. 90 tablet 1 2/14/2024 at 0800    metoprolol tartrate 100 MG Oral Tab TAKE 1 TABLEt BY MOUTH ONCE everu morning and 0.5 tablet every evening 135 tablet 1 2/15/2024 at 0430    verapamil  MG Oral Tab CR Take 1 tablet (240 mg total) by mouth 2 (two) times daily. 180 tablet 0 2/15/2024 at 0430    warfarin 2 MG Oral Tab Take 1-tablet (2mg) on Fridays and take 2-tablets (4mg) other 6-days of week or as directed 180 tablet 3 2/9/2024    digoxin 0.25 MG Oral Tab Take 1 tablet (250 mcg total) by mouth daily. 90 tablet 1      Current Facility-Administered Medications Ordered in Epic   Medication Dose Route Frequency Provider Last Rate Last Admin    lactated ringers infusion   Intravenous Continuous Evin Sosa MD 20 mL/hr at 02/15/24 0606 New Bag at 02/15/24 0606    metoprolol tartrate (Lopressor) tab 25 mg  25 mg Oral Once PRN Evin Sosa MD        ceFAZolin (Ancef) 2 g in 20mL IV syringe premix  2 g Intravenous Once Evin Sosa MD         No current [facility]-ordered outpatient medications on file.       No Known Allergies    Family History   Problem Relation Age of Onset    Cancer Father         Esophageal    Other (Other) Mother         Heart Failer.     Social History     Socioeconomic History    Marital status:    Tobacco Use    Smoking status: Former     Types: Cigars     Passive exposure: Never    Smokeless tobacco: Never    Tobacco comments:     1-2 cigars in a year   Vaping Use    Vaping Use: Never used   Substance and Sexual Activity    Alcohol use: Yes     Alcohol/week: 0.0 standard drinks of alcohol     Comment: 2-3 per week    Drug use: No       Available  pre-op labs reviewed.        Lab Results   Component Value Date    INR 1.03 02/15/2024       Vital Signs:  Body mass index is 41.94 kg/m².   height is 1.753 m (5' 9\") and weight is 128.8 kg (284 lb). His oral temperature is 98.1 °F (36.7 °C). His blood pressure is 139/70 and his pulse is 88. His respiration is 18 and oxygen saturation is 97%.   Vitals:    02/10/24 1509 02/15/24 0601   BP:  139/70   Pulse:  88   Resp:  18   Temp:  98.1 °F (36.7 °C)   TempSrc:  Oral   SpO2:  97%   Weight: 127 kg (280 lb) 128.8 kg (284 lb)   Height: 1.753 m (5' 9\") 1.753 m (5' 9\")        Anesthesia Evaluation      Airway   Mallampati: III  TM distance: >3 FB  Neck ROM: full  Dental      Pulmonary     breath sounds clear to auscultation  (+) sleep apnea  Cardiovascular   (+) pacemaker, hypertension, angina, CHF    Rhythm: regular  Rate: normal    Neuro/Psych      GI/Hepatic/Renal      Endo/Other    Abdominal     Abdomen: soft.     Other findings: Deny loose teeth            Anesthesia Plan:   ASA:  3  Plan:   General  Airway:  ETT  Post-op Pain Management: IV analgesics  Informed Consent Plan and Risks Discussed With:  Patient  Use of Blood Products Discussed With:  Patient  Discussed plan with:  Surgeon      I have informed Chuck NAIDU Ingraffia and/or legal guardian or family member of the nature of the anesthetic plan, benefits, risks including possible dental damage if relevant, major complications, and any alternative forms of anesthetic management.   All of the patient's questions were answered to the best of my ability. The patient desires the anesthetic management as planned.  Cyndy Temple MD  2/15/2024 8:37 AM  Present on Admission:  **None**

## 2024-02-15 NOTE — INTERVAL H&P NOTE
Pre-op Diagnosis: Gross hematuria, BPH with obstruction, and urinary tract symptoms, bladder stones    The above referenced H&P was reviewed by Evin Sosa MD on 2/15/2024, the patient was examined and no significant changes have occurred in the patient's condition since the H&P was performed.  I discussed with the patient and/or legal representative the potential benefits, risks and side effects of this procedure; the likelihood of the patient achieving goals; and potential problems that might occur during recuperation.  I discussed reasonable alternatives to the procedure, including risks, benefits and side effects related to the alternatives and risks related to not receiving this procedure.  We will proceed with procedure as planned.

## 2024-02-15 NOTE — BRIEF OP NOTE
Pre-Operative Diagnosis: Gross hematuria, BPH with obstruction, and urinary tract symptoms, bladder stones     Post-Operative Diagnosis: Gross hematuria, BPH with obstruction, and urinary tract symptoms, bladder stones      Procedure Performed:   Percutaneous   Cystolitholopaxy, transurethral resection of the prostate, suprapubic catheter placement  ( Bladder Stone  5 cm)   Surgeon(s) and Role:     * Evin Sosa MD - Primary    Assistant(s):  None       Surgical Findings: Large Hard Bladder stone      Specimen: Prostate chips      Estimated Blood Loss: Blood Output: 100 mL (2/15/2024 11:20 AM)    Dictation Number:  See op note      Evin Sosa MD  2/15/2024  11:21 AM

## 2024-02-15 NOTE — ANESTHESIA POSTPROCEDURE EVALUATION
Patient: Chuck Goodrich    Procedure Summary       Date: 02/15/24 Room / Location: [facility] MAIN OR 01 / [facility] MAIN OR    Anesthesia Start: 0852 Anesthesia Stop: 1121    Procedures:       Cystolitholopaxy, transurethral resection of the prostate, suprapubic catheter placement (Bladder)      LASER HOLMIUM LITHOTRIPSY (Urethra) Diagnosis: (Gross hematuria, BPH with obstruction, and urinary tract symptoms, bladder stones)    Surgeons: Evin Sosa MD Anesthesiologist: Cyndy Temple MD    Anesthesia Type: general ASA Status: 3            Anesthesia Type: general    Vitals Value Taken Time   /73 02/15/24 1140   Temp 98.1 °F (36.7 °C) 02/15/24 1120   Pulse 64 02/15/24 1142   Resp 16 02/15/24 1142   SpO2 96 % 02/15/24 1142   Vitals shown include unfiled device data.    EM AN Post Evaluation:   Patient Evaluated in PACU  Patient Participation: complete - patient participated  Level of Consciousness: awake and alert  Pain Score: 0  Pain Management: adequate  Airway Patency:patent  Dental exam unchanged from preop  Yes    Nausea/Vomiting: none  Cardiovascular Status: hemodynamically stable  Respiratory Status: nasal cannula and spontaneous ventilation  Postoperative Hydration balanced      Cyndy Temple MD  2/15/2024 11:43 AM

## 2024-02-23 LAB
CARBONATE APATITE: 50 %
MG NH4 PO4 (STRUVITE): 50 %
WEIGHT-STONE: 4866 MG

## 2025-04-29 RX ORDER — NALTREXONE HYDROCHLORIDE 50 MG/1
50 TABLET, FILM COATED ORAL DAILY
COMMUNITY

## 2025-04-29 RX ORDER — FENOFIBRATE 145 MG/1
145 TABLET, FILM COATED ORAL DAILY
COMMUNITY

## 2025-04-29 RX ORDER — PRAVASTATIN SODIUM 40 MG
40 TABLET ORAL NIGHTLY
COMMUNITY

## 2025-04-29 RX ORDER — IBUPROFEN 800 MG/1
800 TABLET, FILM COATED ORAL EVERY 6 HOURS PRN
COMMUNITY
End: 2025-05-23

## 2025-05-08 ENCOUNTER — LABORATORY ENCOUNTER (OUTPATIENT)
Dept: LAB | Age: 70
End: 2025-05-08
Attending: ORTHOPAEDIC SURGERY
Payer: MEDICARE

## 2025-05-08 DIAGNOSIS — Z01.818 PRE-OP TESTING: ICD-10-CM

## 2025-05-08 LAB
ANION GAP SERPL CALC-SCNC: 7 MMOL/L (ref 0–18)
ANTIBODY SCREEN: NEGATIVE
BASOPHILS # BLD AUTO: 0.02 X10(3) UL (ref 0–0.2)
BASOPHILS NFR BLD AUTO: 0.3 %
BUN BLD-MCNC: 23 MG/DL (ref 9–23)
CALCIUM BLD-MCNC: 10.7 MG/DL (ref 8.7–10.6)
CHLORIDE SERPL-SCNC: 108 MMOL/L (ref 98–112)
CO2 SERPL-SCNC: 25 MMOL/L (ref 21–32)
CREAT BLD-MCNC: 1.15 MG/DL (ref 0.7–1.3)
EGFRCR SERPLBLD CKD-EPI 2021: 68 ML/MIN/1.73M2 (ref 60–?)
EOSINOPHIL # BLD AUTO: 0.19 X10(3) UL (ref 0–0.7)
EOSINOPHIL NFR BLD AUTO: 3 %
ERYTHROCYTE [DISTWIDTH] IN BLOOD BY AUTOMATED COUNT: 13.1 %
FASTING STATUS PATIENT QL REPORTED: YES
GLUCOSE BLD-MCNC: 90 MG/DL (ref 70–99)
HCT VFR BLD AUTO: 38.2 % (ref 39–53)
HGB BLD-MCNC: 12.3 G/DL (ref 13–17.5)
IMM GRANULOCYTES # BLD AUTO: 0.02 X10(3) UL (ref 0–1)
IMM GRANULOCYTES NFR BLD: 0.3 %
LYMPHOCYTES # BLD AUTO: 1.16 X10(3) UL (ref 1–4)
LYMPHOCYTES NFR BLD AUTO: 18 %
MCH RBC QN AUTO: 28.7 PG (ref 26–34)
MCHC RBC AUTO-ENTMCNC: 32.2 G/DL (ref 31–37)
MCV RBC AUTO: 89.3 FL (ref 80–100)
MONOCYTES # BLD AUTO: 0.58 X10(3) UL (ref 0.1–1)
MONOCYTES NFR BLD AUTO: 9 %
NEUTROPHILS # BLD AUTO: 4.47 X10 (3) UL (ref 1.5–7.7)
NEUTROPHILS # BLD AUTO: 4.47 X10(3) UL (ref 1.5–7.7)
NEUTROPHILS NFR BLD AUTO: 69.4 %
OSMOLALITY SERPL CALC.SUM OF ELEC: 293 MOSM/KG (ref 275–295)
PLATELET # BLD AUTO: 297 10(3)UL (ref 150–450)
POTASSIUM SERPL-SCNC: 4.3 MMOL/L (ref 3.5–5.1)
RBC # BLD AUTO: 4.28 X10(6)UL (ref 3.8–5.8)
RH BLOOD TYPE: POSITIVE
SODIUM SERPL-SCNC: 140 MMOL/L (ref 136–145)
WBC # BLD AUTO: 6.4 X10(3) UL (ref 4–11)

## 2025-05-08 PROCEDURE — 86850 RBC ANTIBODY SCREEN: CPT

## 2025-05-08 PROCEDURE — 86900 BLOOD TYPING SEROLOGIC ABO: CPT

## 2025-05-08 PROCEDURE — 80048 BASIC METABOLIC PNL TOTAL CA: CPT

## 2025-05-08 PROCEDURE — 87081 CULTURE SCREEN ONLY: CPT

## 2025-05-08 PROCEDURE — 86901 BLOOD TYPING SEROLOGIC RH(D): CPT

## 2025-05-08 PROCEDURE — 36415 COLL VENOUS BLD VENIPUNCTURE: CPT

## 2025-05-08 PROCEDURE — 85025 COMPLETE CBC W/AUTO DIFF WBC: CPT

## 2025-05-09 ENCOUNTER — ANESTHESIA EVENT (OUTPATIENT)
Dept: SURGERY | Facility: HOSPITAL | Age: 70
End: 2025-05-09
Payer: MEDICARE

## 2025-05-16 NOTE — H&P (VIEW-ONLY)
Patient ID: Chuck Goodrich     Chief Complaint:    Patient presents with:  Left Hip - Pre-Op       HPI:  Chuck Goodrich is a 69 year old male who's following up on their left hip pain. Pain is described as aching, grinding, and throbbing, is worse with weight bearing. Pain is located in the proximal anterior thigh. Factors that aggravate symptoms include: getting in and out of car, putting shoes and socks on activity, stair climbing, getting up from a chair. Patient denies any numbness, tingles, or radicular symptoms. Patient's current BMI is 39.28. Patient denies any signs of infection including fever or chills.  Patient states that the symptoms are getting progressively worse.  The pain is affecting their quality of life, ability to sleep at night, ability to perform activities and daily living and to ambulate.  Pain with walking and stair climbing.  They report they do walk with a limp.   They do have some low back pain.  Patient does note stiffness in the hip.  Has tried steroid injections, weight loss, and activity modification as well as nsaids but limited as on blood thinner.      Physical Exam:     Vital Signs:  There were no vitals taken for this visit.    Past Medical History:   Diagnosis Date   • Atrial fibrillation (HCC)    • CHF (congestive heart failure) (HCC)    • Hypertension    • Long term (current) use of anticoagulants    • Mild mitral regurgitation    • ORI (obstructive sleep apnea)      Past Surgical History:   Procedure Laterality Date   • CARDIAC PACEMAKER PLACEMENT      [employer]tr[employer]   • COLONOSCOPY N/A 1/19/2017    Procedure: COLONOSCOPY;  Surgeon: Fidel Cuenca MD;  Location:  ENDOSCOPY   • OTHER SURGICAL HISTORY      Pacemaker     Current Outpatient Medications   Medication Sig Dispense Refill   • acetaminophen 500 MG Oral Tab Take 2 tablets (1,000 mg total) by mouth every 8 (eight) hours for 14 days. 84 tablet 0   • celecoxib (CELEBREX) 200 MG Oral Cap Take 1 capsule (200 mg  total) by mouth daily. Take 1 capsule (200 mg total) by mouth daily. Take with food. 14 capsule 0   • pregabalin (LYRICA) 75 MG Oral Cap Take 1 capsule (75 mg total) by mouth daily. 30 capsule 0   • OXYCODONE Oral Tab Take 0.5-1 tablets (2.5-5 mg total) by mouth every 4 (four) hours as needed for Pain. 30 tablet 0   • senna-docusate 8.6-50 MG Oral Tab Take 1 tablet by mouth daily. Take daily while taking narcotics regularly for pain. 20 tablet 2   • traMADol 50 MG Oral Tab Take 1 tablet (50 mg total) by mouth every 4 to 6 hours as needed for Pain (Can alternate with Oxycodone). Do not take along with oxycodone or norco (hydrocodone) - separate by 2 hours if needed 40 tablet 0   • cephalexin 500 MG Oral Cap Take 1 capsule (500 mg total) by mouth 2 (two) times daily for 10 days. 20 capsule 0   • FENOFIBRATE 145 MG Oral Tab TAKE 1 TABLET BY MOUTH EVERY DAY 90 tablet 0   • PRAVASTATIN 40 MG Oral Tab TAKE 1 TABLET BY MOUTH EVERY DAY 90 tablet 0   • ibuprofen 800 MG Oral Tab Take 1 tablet (800 mg total) by mouth every 6 (six) hours as needed for Pain. For 2 weeks 60 tablet 3   • naltrexone 50 MG Oral Tab Take 1 tablet (50 mg total) by mouth daily. 90 tablet 5   • metFORMIN 850 MG Oral Tab Take 1 tablet (850 mg total) by mouth in the morning, at noon, and at bedtime. 270 tablet 3   • metoprolol tartrate 100 MG Oral Tab TAKE 1 TABLET BY MOUTH every morning and AND 0.5 tablet every evening 135 tablet 2   • warfarin 2 MG Oral Tab Take one tablet (2mg) 6-days weekly and take two tablets (4mg) 1-day weekly or as directed 120 tablet 4   • verapamil  MG Oral Tab CR Take 1 tablet (240 mg total) by mouth 2 (two) times daily. 180 tablet 3     No Known Allergies  Social History    Tobacco Use      Smoking status: Never      Smokeless tobacco: Never    Alcohol use: Yes      Alcohol/week: 0.0 standard drinks of alcohol      Comment: 2-3 per week    Family History   Problem Relation Age of Onset   • Cancer Father          Esophageal   • Other (Other) Mother         Heart Failer.       ROS:     All other pertinent positives and negatives as noted above in HPI.    Physical Exam  Vital Signs:  There were no vitals taken for this visit.  Estimated body mass index is 39.09 kg/m² as calculated from the following:    Height as of 5/6/25: 5' 7.72\" (1.72 m).    Weight as of 5/6/25: 255 lb (115.7 kg).    antalgic gait  without assistive device    Right hip: Limited internal (15 degrees) and external rotation (25 degrees) with no pain in the groin region, Limited abduction (0-15/20) degrees, adduction (15 degrees) and flexion ( -5-95 degrees)         5/5 strength  5/5 knee flexion and extension  5/5 ankle dorsiflexion and plantarflexion  Sensation grossly intact in thigh, leg and foot  Negative Trendelenburg sign  Negative Stinchfield sign  2+ pedal pulses and brisk capillary refill  No skin rashes or lesion noted    Left hip: Limited internal (0 degrees) and external rotation (15 degrees) with  pain in the groin region, Limited abduction (0-15/20) degrees, adduction (15 degrees) and flexion ( -5-95 degrees)      5/5 strength  5/5 knee flexion and extension  5/5 ankle dorsiflexion and plantarflexion  Sensation grossly intact in thigh, leg and foot  Negative Trendelenburg sign  Negative Stinchfield sign  2+ pedal pulses and brisk capillary refill  No skin rashes or lesions noted    Back:  Deformity: no  Pelvic obliquity: no  Tenderness: no    Diagnostic Studies:     Radiographs:    Imaging was personally and individually reviewed and discussed at length with the patient:    Two views of the left hip(s) were reviewed in the office today, including AP pelvis and lateral views.  There is severe joint space narrowing (bone-on-bone) with large osteophyte formation off the acetabulum and the head neck junction.  There is sclerosis noted in the femoral head and acetabulum. There is no fracture or dislocation.  No periosteal reactions or medullary  lesions are seen.        Assessment:     left hip osteoarthritis        Plan:   Continuation of conservative management vs. FARIDEH discussed.  The patient wishes to proceed with left total hip replacement.      One or more of the conservative treatments have been tried and failed for three months or more except in special circumstances where delay of definitive care is not appropriate: non steroid anti-infammatory medication(s), physical therapy, corticosteroid injection(s), and weight loss    Risk and benefits of surgery were reviewed.  Including, but not limited to, blood clots, anesthesia risk, infection, leg length discrepancy, failure of the implant, need for future surgeries, continued pain, hematoma, need for transfusion, and death, among others.  The patient understands and wishes to proceed.     The spectrum of treatment options were discussed with the patient in detail including both the nonoperative and operative treatment modalities and their respective risks and benefits.  After thorough discussion, the patient has elected to undergo surgical treatment.  The details of the surgical procedure were explained including the location of probable incisions and a description of the likely implants to be used.  Models and diagrams were used as educational resources. The patient understands the likely convalescence after surgery, as well as the rehabilitation required.  We thoroughly discussed the risks, benefits, and alternatives to surgery.  The risks include but are not limited to the risk of infection, joint stiffness, blood clots (including DVT and/or pulmonary embolus along with the risk of death), neurologic and/or vascular injury, fracture, dislocation, nonunion, malunion, need for further surgery including hardware failure requiring revision, and continued pain.  It was explained that if tissue has been repaired or reconstructed, there is also a chance of failure which may require further management.  In  addition, we have discussed the risks, including the risk of disease transmission, associated with any allograft tissue which may be utilized.  Following the completion of the discussion, the patient expressed understanding of this planned course of care, all their questions were answered and consent will be obtained preoperatively.    The risks, benefits and alternatives of surgery were discussed with the patient including, but not limited to:   You may be allergic to the medicine that you get during surgery.   The nerves or tendons around your hip may be hurt during surgery.   You may bleed more than expected, requiring blood transfusion.  You may get an infection which will require additional surgery and possibly removal of all implants to cure. Patients with diabetes or who are on certain medications to suppress the immune system are particularly at risk for infection. Patients over 40 BMI are at significant risk for infection and wound healing problems.  You could have a fracture during surgery, or within several weeks after surgery. Patients with osteopenia or osteoporosis are at increased risk for fracture during and after surgery.  You may have problems with your heart and/or kidneys. Patients with history of heart disease are at increased risk for cardiac complications and some of the medications used during and after surgery may affect kidney function.  After surgery, your hip may be stiff and painful. Your hip and knee may swell. This usually lasts about 6 weeks and may be permanent.  Your legs may not be the same length.   Your implant may get loose or move out of place causing pain.  The implant may get worn out over time and need to be replaced.   After having surgery, you may lose your balance and be more likely to fall for a time.   You can dislocate your hip which would require an additional procedure and potentially surgery to correct.  You may get a blood clot in your leg or arm. This can cause  pain and swelling, and it can stop blood from flowing where it needs to go in your body. The blood clot can break loose and travel to your lungs or brain. A blood clot in your lungs can cause chest pain, trouble breathing and possibly death. A blood clot in your brain can cause a stroke. These problems can be life-threatening.       Pain medication discussed.      Left FARIDEH at [facility] (Obs) - 05/22/2025    no smoking  no diabetes - Last A1c value was  % done  .  BMI - 39  no ORI  no hx of infections/MRSA/dental/joint  no hx of blood clots   yes blood thinner - coumadin for afib  last injection - 5 mo ago  no lumbar surgery  no allergy  Afib Cardiac history and pacemaker   No Pulmonary history       - risks, benefits and alternatives discussed  - labs reviewed and medications sent to pharmacy  - PPX antibiotics sent to pharmacy  - proceed with left total hip arthroplasty as planned    Plan reviewed with Dr. Josh Jack, [facility]      Diagnoses and all orders for this visit:    Primary osteoarthritis of left hip    S/P total left hip arthroplasty  -     acetaminophen 500 MG Oral Tab; Take 2 tablets (1,000 mg total) by mouth every 8 (eight) hours for 14 days.  -     celecoxib (CELEBREX) 200 MG Oral Cap; Take 1 capsule (200 mg total) by mouth daily. Take 1 capsule (200 mg total) by mouth daily. Take with food.  -     PT AND/OR OT EVAL & TREAT (DULY); Standing  -     HOME HEALTH (EXT)  -     pregabalin (LYRICA) 75 MG Oral Cap; Take 1 capsule (75 mg total) by mouth daily.  -     OXYCODONE Oral Tab; Take 0.5-1 tablets (2.5-5 mg total) by mouth every 4 (four) hours as needed for Pain.  -     senna-docusate 8.6-50 MG Oral Tab; Take 1 tablet by mouth daily. Take daily while taking narcotics regularly for pain.  -     traMADol 50 MG Oral Tab; Take 1 tablet (50 mg total) by mouth every 4 to 6 hours as needed for Pain (Can alternate with Oxycodone). Do not take along with oxycodone or norco (hydrocodone) - separate by 2  hours if needed  -     cephalexin 500 MG Oral Cap; Take 1 capsule (500 mg total) by mouth 2 (two) times daily for 10 days.

## 2025-05-21 NOTE — DISCHARGE INSTRUCTIONS
Sometimes managing your health at home requires assistance.  The [facility] team has recognized your preference to use [facility].  They can be reached by phone at [phone].  The fax number for your reference is [phone].  A representative from the home health agency will contact you or your family to schedule your first visit.     Hip Replacement Discharge Instructions    Dear Patient,     [facility] cares about your progress with recovery following your joint replacement surgery.     300 days from your scheduled surgery, [facility] will send you a follow-up survey to help us understand how your surgery impacted your mobility, pain, and overall quality of life. Please make every effort to complete this survey. The information collected from this survey will be used by your physician to track your recovery.     Sincerely,     [facility] [place]    Activity    Bathing  No tub baths, pools, or saunas until cleared by surgeon (about 4-6 weeks because it takes that long for the incision on the skin to heal and be a barrier to prevent infection.)  When allowed to shower:      AQUACEL dressing is waterproof and does not require being covered before showering.  Pat dressing and surrounding skin dry after shower                      AQUACEL        MEDIPORE/COVERLET dressing is NOT waterproof and REQUIRES being covered with a waterproof barrier to keep the dressing and incision dry.  SARAN WRAP, GLAD WRAP, PRESS N SEAL WORK REALLY WELL BUT ANY PLASTIC WRAP WILL DO.  Do not wash incision.   Remove entire wrapping and old dressing (if Medipore/coverlet) after showering. Pat dry with a CLEAN TOWEL if necessary and cover incision with new Medipore/coverlet. For other types of dressings, follow surgeon’s orders.                  MEDIPORE/COVERLET            Driving  Do not drive until cleared by surgeon. This is usually four to six weeks after surgery.  Discuss this at follow-up office visit.   Not allowed while taking narcotic pain medication or muscle relaxants.    Sex  Usually allowed after four to six weeks - check with surgeon at your office visit.  Return to work  Usually allowed after four to six weeks. Discuss specific work activities with your surgeon.    Restrictions  For hip replacement surgery, follow instructions provided by physical therapy    No smoking  Avoid smoking. It is known to cause breathing problems and can decrease the rate of healing.    Incision care/Dressing changes  Wash hands before and after dressing changes.    FOR MEDIPORE/COVERLET DRESSINGS:  Change dressing daily using Medipore/coverlet once Aquacel (waterproof) dressing is removed (which is about 7 days after surgery). Patient should be standing or lying flat so dressing goes on smoothly.  (This dressing needed for hip patients because of location of incision-don’t want contamination from bathroom use)   Continue this until your first visit with your surgeon’s office.  There could be a small amount of redness around the staples or incision; this is normal.  Watch for increased redness, warmth, any odor, increased drainage or opening of the incision. A little clear yellow or blood tinged drainage is normal up to 2 weeks after surgery but it should be less every day until it stops.  Call physician if you notice any concerning changes.  Sutures/staples will be removed at first office visit (10 days- 3 weeks).                         MEDIPORE /COVERLET          Medication  Anticoagulants = blood thinners (Xarelto, Eliquis, Lovenox, Coumadin or Aspirin)  Pill or shot form depending on what your physician orders.   IF placed on Coumadin, you may also need lab work done for monitoring.  You will bleed easier and bruise easier while on these medications.     Usually you will be on a blood thinner for about 4-5 weeks.  Contact your physician if you have signs of bruising, nose bleeds or  blood in your urine. Use electric razors and soft toothbrushes only.  Do not take aspirin while taking blood thinners unless ordered by your physician.  Review anticoagulant education information sheet provided.    Discomfort  Surgical discomfort is normal for one to two months.  Have realistic goals and keep a positive outlook.  Keep pain manageable; pain should not disrupt your sleep or activities like getting out of bed or walking.  You may need pain medication regularly (every 4-6 hours) the first 2 weeks and then begin to decrease how often you are taking it.  Take pain medication as prescribed with food, especially before therapy, allowing 30-60 minutes to take effect.  Do not drink alcohol while on pain medication.  As you have less discomfort, decrease the amount of pain medication you take. Use plain Tylenol (acetaminophen) for less severe pain.  Some pain medications have Tylenol (acetaminophen) in them such as Norco and Percocet. Do NOT take Tylenol (acetaminophen) within 4 hours of a dose of these medications.  Apply ice  or cold therapy to surgical site for 20 minutes at least four times a day, especially after therapy. Be sure there is a thin cloth barrier between skin and ice or cold therapy.  Change position at least every 45 minutes while awake to avoid stiffness or increased discomfort.  Deep breathing and relaxation techniques and distractions can help!  If you focus on something else, you do not experience the pain the same. Take advantage of everything available to your to help control you discomfort.  Contact physician if discomfort does not respond to pain medication.    Body changes  Constipation is common with the use of narcotics.  Eat fiber rich foods and drink plenty of fluids.  Use stool softeners such as Colace or Senakot while on narcotics, and laxatives such as Miralax or Milk of Magnesia if needed.   An enema or suppository may be needed if above measures do not work.    Prevention  of infection and promotion of healing  Good hand washing is important. Everyone should wash their hands or use hand  as soon as they walk in your house-whether they live there or are visiting.  Keep bed linen/clothing freshly laundered.  Do not allow others or pets to touch your incision.  Avoid people that have colds or the flu.  Your surgeon may recommend that you take antibiotics before you undergo any dental or other invasive surgical procedures after your joint replacement. Speak with your physician about this at your post-op office visit.  Eat a balanced diet high in fiber and drink plenty of fluids.   Continue using incentive spirometry because narcotics make you sleepy so you may not take good deep breaths. We do not want you to get pneumonia.     Post op Office visits  Schedule 10 days to 3 weeks after surgery WITH SURGEON’s office.  Additional visits may need to be scheduled. Your physician will discuss this at first post-op office visit.  Schedule outpatient physical therapy per your surgeon’s orders.  Schedule one week follow up after surgery WITH PRIMARY CARE PHYSICIAN; review your medications over last 6 months.  Your body gets stressed by surgery and that stress can affect all your other health issues (such as high blood pressure, diabetes, CHF, afib, and asthma just to name a few).  We don’t want those other health issues to cause you to get readmitted to the hospital; much better for you to catch developing problems and prevent them from becoming larger ones.  DWIGHT HOSE - IF ordered by your surgeon, wear these during the day and off at night.  Surgeon will tell you when you don’t need them anymore.    Notify your surgeon if you notice any of the following signs  Separation of incision line.  Increased redness, swelling, or warmth of skin around incision.  Increased or foul smelling drainage from incision  Red streaks on skin near incision.  Temperature >100.4F.  Increased pain at incision  not relieved by pain medication.    Signs of Possible Dislocation  Increased severe leg or groin pain  Turning in or out of surgical leg that is new  Increased numbness, tingling to leg  Inability to walk or put weight on your surgical leg        Signs of blood clot  Pain, excessive tenderness, redness, or swelling in leg or calf (other than incision site).    Go directly to the ER or CALL 911 if  you:  become short of breath  have chest pain  cough up blood  have unexplained anxiety with breathing   Traveling and Handicapped parking  Check with you surgeon when you are allowed to travel so you don’t set yourself up for greater chance of complications.  If traveling by car, get out to stretch every 2 hours.  This helps prevent stiffness.  You may need to do this any time you travel for the first year after surgery.  If traveling by plane, BEFORE you get into a security line, let them know that you had your hip replaced, as you will most likely set off the metal detector. The doctors no longer provide an identification card for this as they are easily copied. ALSO request a wheelchair the first year to board and get off a plane…this aids in priority seating and you should sit on the aisle or at the bulkhead where you can easily stretch your legs and get up to walk up and down the aisles…this helps prevent blood clots and stiffness.  TEMPORARY HANDICAP PARKING APPLICATION  (good for 3-6 months)  - At Surgeon or PCP visit, request they fill out the form, then go to [employer] (only time you do not wait in a long line there). Some [facility] offices provide the same service. ([facility] and [place] have this service; if you live in another [facility], you may check with them as well). You need space to open car doors to position yourself properly with walker to get in and out of your car safely; some parking spaces are  practically on top of each other and do not give you enough room.

## 2025-05-21 NOTE — ANESTHESIA PREPROCEDURE EVALUATION
PRE-OP EVALUATION    Patient Name: Chuck Goodrich    Admit Diagnosis: PRIMARY OSTEOARTHRITIS    Pre-op Diagnosis: PRIMARY OSTEOARTHRITIS    LEFT ANTERIOR TOTAL HIP ARTHROPLASTY    Anesthesia Procedure: LEFT ANTERIOR TOTAL HIP ARTHROPLASTY (Left)    Surgeons and Role:     * Rey Moreno MD - Primary    Pre-op vitals reviewed.        Body mass index is 38.77 kg/m².    Current medications reviewed.  Hospital Medications:  Current Medications[1]    Outpatient Medications:   Prescriptions Prior to Admission[2]    Allergies: Patient has no known allergies.      Anesthesia Evaluation        Anesthetic Complications           GI/Hepatic/Renal                                 Cardiovascular  Comment: Echo 3/2025 with normal biventricular fxn, mild aortic stenosis, mild MR, mild TR              (+) obesity  (+) hypertension           (+) pacemaker/AICD (for SSS. not PM dependent but hasn't been checked since 2023?)       (+) dysrhythmias (permanent A fib on coumadin)   (+) CHF                Endo/Other                                  Pulmonary                    (+) sleep apnea       Neuro/Psych                                      Past Surgical History[3]  Social Hx on file[4]  History   Drug Use No     Available pre-op labs reviewed.  Lab Results   Component Value Date    WBC 6.4 05/08/2025    RBC 4.28 05/08/2025    HGB 12.3 (L) 05/08/2025    HCT 38.2 (L) 05/08/2025    MCV 89.3 05/08/2025    MCH 28.7 05/08/2025    MCHC 32.2 05/08/2025    RDW 13.1 05/08/2025    .0 05/08/2025     Lab Results   Component Value Date     05/08/2025    K 4.3 05/08/2025     05/08/2025    CO2 25.0 05/08/2025    BUN 23 05/08/2025    CREATSERUM 1.15 05/08/2025    GLU 90 05/08/2025    CA 10.7 (H) 05/08/2025            Airway      Mallampati: III  Mouth opening: 3 FB  TM distance: 4 - 6 cm  Neck ROM: full Cardiovascular      Rhythm: regular  Rate: normal     Dental    Dentition appears grossly intact          Pulmonary      Breath sounds clear to auscultation bilaterally.               Other findings              ASA: 3   Plan: spinal  NPO status verified and patient meets guidelines.  Patient has not taken beta blockers in last 24 hours.  Post-procedure pain management plan discussed with surgeon and patient.      Plan/risks discussed with: patient and spouse (Risks of spinal discussed including bleeding, infection, damage to nerves (all very rare), headache, need to convert to GA and those risks including N/V, sore throat, dental injruy, cardiopulmonary compromise. Pt in agreement with plan.)  Use of blood product(s) discussed with: patient    Consented to blood products.          Present on Admission:  **None**             [1]    [START ON 5/22/2025] povidone-iodine (Betadine) 10 % 17.5 mL in sodium chloride 0.9 % 500 mL irrigation   Irrigation Once (Intra-Op)    [START ON 5/22/2025] clonidine/epinephrine/ropivacaine/ketorolac in 0.9% NaCl 60 mL pain cocktail syringe for hip arthroplasty   Infiltration Once (Intra-Op)   [2]   No medications prior to admission.   [3]   Past Surgical History:  Procedure Laterality Date    Cardiac defibrillator placement      Cardiac pacemaker placement      Medtronic. EP: Dr Dior    Colonoscopy N/A 01/19/2017    Procedure: COLONOSCOPY;  Surgeon: Fidel Cuenca MD;  Location:  ENDOSCOPY    Colonoscopy      Eye surgery      Other surgical history      Pacemaker   [4]   Social History  Socioeconomic History    Marital status:    Tobacco Use    Smoking status: Former     Types: Cigars     Passive exposure: Never    Smokeless tobacco: Never    Tobacco comments:     1-2 cigars in a year   Vaping Use    Vaping status: Never Used   Substance and Sexual Activity    Alcohol use: Yes     Alcohol/week: 1.0 standard drink of alcohol     Types: 1 Standard drinks or equivalent per week    Drug use: No

## 2025-05-22 ENCOUNTER — APPOINTMENT (OUTPATIENT)
Dept: GENERAL RADIOLOGY | Facility: HOSPITAL | Age: 70
End: 2025-05-22
Attending: ORTHOPAEDIC SURGERY
Payer: MEDICARE

## 2025-05-22 ENCOUNTER — HOSPITAL ENCOUNTER (OUTPATIENT)
Facility: HOSPITAL | Age: 70
Discharge: HOME HEALTH | End: 2025-05-23
Attending: ORTHOPAEDIC SURGERY | Admitting: ORTHOPAEDIC SURGERY
Payer: MEDICARE

## 2025-05-22 ENCOUNTER — ANESTHESIA (OUTPATIENT)
Dept: SURGERY | Facility: HOSPITAL | Age: 70
End: 2025-05-22
Payer: MEDICARE

## 2025-05-22 DIAGNOSIS — Z01.818 PRE-OP TESTING: Primary | ICD-10-CM

## 2025-05-22 LAB
INR BLD: 1.2 (ref 0.8–1.2)
PROTHROMBIN TIME: 15.4 SECONDS (ref 11.6–14.8)
RH BLOOD TYPE: POSITIVE

## 2025-05-22 PROCEDURE — 88311 DECALCIFY TISSUE: CPT | Performed by: ORTHOPAEDIC SURGERY

## 2025-05-22 PROCEDURE — 73501 X-RAY EXAM HIP UNI 1 VIEW: CPT | Performed by: ORTHOPAEDIC SURGERY

## 2025-05-22 PROCEDURE — 97161 PT EVAL LOW COMPLEX 20 MIN: CPT

## 2025-05-22 PROCEDURE — 88304 TISSUE EXAM BY PATHOLOGIST: CPT | Performed by: ORTHOPAEDIC SURGERY

## 2025-05-22 PROCEDURE — 94760 N-INVAS EAR/PLS OXIMETRY 1: CPT

## 2025-05-22 PROCEDURE — 97530 THERAPEUTIC ACTIVITIES: CPT

## 2025-05-22 PROCEDURE — 76000 FLUOROSCOPY <1 HR PHYS/QHP: CPT | Performed by: ORTHOPAEDIC SURGERY

## 2025-05-22 PROCEDURE — 93005 ELECTROCARDIOGRAM TRACING: CPT

## 2025-05-22 PROCEDURE — 93010 ELECTROCARDIOGRAM REPORT: CPT | Performed by: INTERNAL MEDICINE

## 2025-05-22 PROCEDURE — 85610 PROTHROMBIN TIME: CPT | Performed by: ORTHOPAEDIC SURGERY

## 2025-05-22 DEVICE — IMPLANTABLE DEVICE
Type: IMPLANTABLE DEVICE | Site: HIP | Status: FUNCTIONAL
Brand: G7® VIVACIT-E®

## 2025-05-22 DEVICE — BIOLOX® DELTA, CERAMIC FEMORAL HEAD, M, Ø 40/0, TAPER 12/14
Type: IMPLANTABLE DEVICE | Site: HIP | Status: FUNCTIONAL
Brand: BIOLOX® DELTA

## 2025-05-22 DEVICE — IMPLANTABLE DEVICE
Type: IMPLANTABLE DEVICE | Site: HIP | Status: FUNCTIONAL
Brand: AVENIR COMPLETE™

## 2025-05-22 DEVICE — IMPLANTABLE DEVICE
Type: IMPLANTABLE DEVICE | Site: HIP | Status: FUNCTIONAL
Brand: G7® ACETABULAR SYSTEM

## 2025-05-22 RX ORDER — AMOXICILLIN 250 MG
1 CAPSULE ORAL DAILY
COMMUNITY

## 2025-05-22 RX ORDER — FERROUS SULFATE 325(65) MG
325 TABLET, DELAYED RELEASE (ENTERIC COATED) ORAL
Status: DISCONTINUED | OUTPATIENT
Start: 2025-05-22 | End: 2025-05-23

## 2025-05-22 RX ORDER — FAMOTIDINE 10 MG/ML
20 INJECTION, SOLUTION INTRAVENOUS 2 TIMES DAILY
Status: DISCONTINUED | OUTPATIENT
Start: 2025-05-22 | End: 2025-05-23

## 2025-05-22 RX ORDER — DIPHENHYDRAMINE HCL 25 MG
25 CAPSULE ORAL EVERY 4 HOURS PRN
Status: DISCONTINUED | OUTPATIENT
Start: 2025-05-22 | End: 2025-05-23

## 2025-05-22 RX ORDER — SODIUM CHLORIDE, SODIUM LACTATE, POTASSIUM CHLORIDE, CALCIUM CHLORIDE 600; 310; 30; 20 MG/100ML; MG/100ML; MG/100ML; MG/100ML
INJECTION, SOLUTION INTRAVENOUS CONTINUOUS
Status: DISCONTINUED | OUTPATIENT
Start: 2025-05-22 | End: 2025-05-23

## 2025-05-22 RX ORDER — METOPROLOL TARTRATE 50 MG
50 TABLET ORAL EVERY EVENING
Status: DISCONTINUED | OUTPATIENT
Start: 2025-05-23 | End: 2025-05-23

## 2025-05-22 RX ORDER — ACETAMINOPHEN 500 MG
1000 TABLET ORAL EVERY 8 HOURS
COMMUNITY

## 2025-05-22 RX ORDER — HYDROMORPHONE HYDROCHLORIDE 1 MG/ML
0.2 INJECTION, SOLUTION INTRAMUSCULAR; INTRAVENOUS; SUBCUTANEOUS EVERY 5 MIN PRN
Status: DISCONTINUED | OUTPATIENT
Start: 2025-05-22 | End: 2025-05-22 | Stop reason: HOSPADM

## 2025-05-22 RX ORDER — CEPHALEXIN 500 MG/1
500 CAPSULE ORAL 2 TIMES DAILY
Status: ON HOLD | COMMUNITY
End: 2025-05-22

## 2025-05-22 RX ORDER — ACETAMINOPHEN 325 MG/1
650 TABLET ORAL ONCE
Status: DISCONTINUED | OUTPATIENT
Start: 2025-05-22 | End: 2025-05-22 | Stop reason: HOSPADM

## 2025-05-22 RX ORDER — OXYCODONE HYDROCHLORIDE 5 MG/1
2.5 TABLET ORAL ONCE AS NEEDED
Status: DISCONTINUED | OUTPATIENT
Start: 2025-05-22 | End: 2025-05-22 | Stop reason: HOSPADM

## 2025-05-22 RX ORDER — METOPROLOL TARTRATE 100 MG/1
100 TABLET ORAL
Status: DISCONTINUED | OUTPATIENT
Start: 2025-05-23 | End: 2025-05-23

## 2025-05-22 RX ORDER — TRANEXAMIC ACID 10 MG/ML
1000 INJECTION, SOLUTION INTRAVENOUS ONCE
Status: DISCONTINUED | OUTPATIENT
Start: 2025-05-22 | End: 2025-05-22 | Stop reason: HOSPADM

## 2025-05-22 RX ORDER — PREGABALIN 75 MG/1
75 CAPSULE ORAL DAILY
Status: DISCONTINUED | OUTPATIENT
Start: 2025-05-23 | End: 2025-05-23

## 2025-05-22 RX ORDER — PREGABALIN 75 MG/1
75 CAPSULE ORAL DAILY
COMMUNITY
Start: 2025-05-16

## 2025-05-22 RX ORDER — METOPROLOL TARTRATE 100 MG/1
50 TABLET ORAL EVERY EVENING
COMMUNITY

## 2025-05-22 RX ORDER — POLYETHYLENE GLYCOL 3350 17 G/17G
17 POWDER, FOR SOLUTION ORAL DAILY PRN
Status: DISCONTINUED | OUTPATIENT
Start: 2025-05-22 | End: 2025-05-23

## 2025-05-22 RX ORDER — HYDROMORPHONE HYDROCHLORIDE 1 MG/ML
0.4 INJECTION, SOLUTION INTRAMUSCULAR; INTRAVENOUS; SUBCUTANEOUS EVERY 5 MIN PRN
Status: DISCONTINUED | OUTPATIENT
Start: 2025-05-22 | End: 2025-05-22 | Stop reason: HOSPADM

## 2025-05-22 RX ORDER — FAMOTIDINE 20 MG/1
20 TABLET, FILM COATED ORAL 2 TIMES DAILY
Status: DISCONTINUED | OUTPATIENT
Start: 2025-05-22 | End: 2025-05-23

## 2025-05-22 RX ORDER — MIDAZOLAM HYDROCHLORIDE 1 MG/ML
INJECTION INTRAMUSCULAR; INTRAVENOUS AS NEEDED
Status: DISCONTINUED | OUTPATIENT
Start: 2025-05-22 | End: 2025-05-22 | Stop reason: SURG

## 2025-05-22 RX ORDER — VERAPAMIL HYDROCHLORIDE 240 MG/1
240 TABLET, FILM COATED, EXTENDED RELEASE ORAL 2 TIMES DAILY
Status: DISCONTINUED | OUTPATIENT
Start: 2025-05-23 | End: 2025-05-23

## 2025-05-22 RX ORDER — WARFARIN SODIUM 2 MG/1
2 TABLET ORAL SEE ADMIN INSTRUCTIONS
COMMUNITY

## 2025-05-22 RX ORDER — OXYCODONE HYDROCHLORIDE 5 MG/1
5 TABLET ORAL ONCE AS NEEDED
Status: DISCONTINUED | OUTPATIENT
Start: 2025-05-22 | End: 2025-05-22 | Stop reason: HOSPADM

## 2025-05-22 RX ORDER — METOCLOPRAMIDE HYDROCHLORIDE 5 MG/ML
10 INJECTION INTRAMUSCULAR; INTRAVENOUS EVERY 8 HOURS PRN
Status: DISCONTINUED | OUTPATIENT
Start: 2025-05-22 | End: 2025-05-22 | Stop reason: HOSPADM

## 2025-05-22 RX ORDER — METOPROLOL TARTRATE 50 MG
50 TABLET ORAL ONCE
Status: COMPLETED | OUTPATIENT
Start: 2025-05-22 | End: 2025-05-22

## 2025-05-22 RX ORDER — BISACODYL 10 MG
10 SUPPOSITORY, RECTAL RECTAL
Status: DISCONTINUED | OUTPATIENT
Start: 2025-05-22 | End: 2025-05-23

## 2025-05-22 RX ORDER — ATORVASTATIN CALCIUM 10 MG/1
10 TABLET, FILM COATED ORAL NIGHTLY
Status: DISCONTINUED | OUTPATIENT
Start: 2025-05-22 | End: 2025-05-23

## 2025-05-22 RX ORDER — OXYCODONE HYDROCHLORIDE 5 MG/1
TABLET ORAL EVERY 4 HOURS PRN
COMMUNITY

## 2025-05-22 RX ORDER — ACETAMINOPHEN 500 MG
1000 TABLET ORAL ONCE
Status: DISCONTINUED | OUTPATIENT
Start: 2025-05-22 | End: 2025-05-22 | Stop reason: HOSPADM

## 2025-05-22 RX ORDER — DIPHENHYDRAMINE HYDROCHLORIDE 50 MG/ML
12.5 INJECTION, SOLUTION INTRAMUSCULAR; INTRAVENOUS EVERY 4 HOURS PRN
Status: DISCONTINUED | OUTPATIENT
Start: 2025-05-22 | End: 2025-05-23

## 2025-05-22 RX ORDER — METOPROLOL TARTRATE 100 MG/1
100 TABLET ORAL EVERY MORNING
COMMUNITY

## 2025-05-22 RX ORDER — TRANEXAMIC ACID 10 MG/ML
1000 INJECTION, SOLUTION INTRAVENOUS ONCE
Status: COMPLETED | OUTPATIENT
Start: 2025-05-22 | End: 2025-05-22

## 2025-05-22 RX ORDER — TRAMADOL HYDROCHLORIDE 50 MG/1
50 TABLET ORAL
COMMUNITY
Start: 2025-05-16

## 2025-05-22 RX ORDER — DEXAMETHASONE SODIUM PHOSPHATE 10 MG/ML
8 INJECTION, SOLUTION INTRAMUSCULAR; INTRAVENOUS ONCE
Status: COMPLETED | OUTPATIENT
Start: 2025-05-23 | End: 2025-05-23

## 2025-05-22 RX ORDER — WARFARIN SODIUM 4 MG/1
4 TABLET ORAL
COMMUNITY

## 2025-05-22 RX ORDER — KETOROLAC TROMETHAMINE 15 MG/ML
15 INJECTION, SOLUTION INTRAMUSCULAR; INTRAVENOUS EVERY 6 HOURS
Status: DISCONTINUED | OUTPATIENT
Start: 2025-05-22 | End: 2025-05-23

## 2025-05-22 RX ORDER — LIDOCAINE HYDROCHLORIDE 10 MG/ML
INJECTION, SOLUTION EPIDURAL; INFILTRATION; INTRACAUDAL; PERINEURAL AS NEEDED
Status: DISCONTINUED | OUTPATIENT
Start: 2025-05-22 | End: 2025-05-22 | Stop reason: SURG

## 2025-05-22 RX ORDER — ONDANSETRON 2 MG/ML
4 INJECTION INTRAMUSCULAR; INTRAVENOUS EVERY 6 HOURS PRN
Status: DISCONTINUED | OUTPATIENT
Start: 2025-05-22 | End: 2025-05-23

## 2025-05-22 RX ORDER — WARFARIN SODIUM 1 MG/1
4 TABLET ORAL
Status: COMPLETED | OUTPATIENT
Start: 2025-05-22 | End: 2025-05-22

## 2025-05-22 RX ORDER — FENOFIBRATE 134 MG/1
134 CAPSULE ORAL
Status: DISCONTINUED | OUTPATIENT
Start: 2025-05-23 | End: 2025-05-23

## 2025-05-22 RX ORDER — ACETAMINOPHEN 325 MG/1
650 TABLET ORAL
Status: DISCONTINUED | OUTPATIENT
Start: 2025-05-22 | End: 2025-05-23

## 2025-05-22 RX ORDER — METOPROLOL TARTRATE 50 MG
50 TABLET ORAL EVERY EVENING
Status: DISCONTINUED | OUTPATIENT
Start: 2025-05-22 | End: 2025-05-22

## 2025-05-22 RX ORDER — OXYCODONE HYDROCHLORIDE 5 MG/1
5 TABLET ORAL EVERY 4 HOURS PRN
Status: DISCONTINUED | OUTPATIENT
Start: 2025-05-22 | End: 2025-05-23

## 2025-05-22 RX ORDER — DIPHENHYDRAMINE HYDROCHLORIDE 50 MG/ML
25 INJECTION, SOLUTION INTRAMUSCULAR; INTRAVENOUS ONCE AS NEEDED
Status: ACTIVE | OUTPATIENT
Start: 2025-05-22 | End: 2025-05-22

## 2025-05-22 RX ORDER — TRAMADOL HYDROCHLORIDE 50 MG/1
50 TABLET ORAL EVERY 6 HOURS SCHEDULED
Status: DISCONTINUED | OUTPATIENT
Start: 2025-05-22 | End: 2025-05-23

## 2025-05-22 RX ORDER — METOCLOPRAMIDE HYDROCHLORIDE 5 MG/ML
10 INJECTION INTRAMUSCULAR; INTRAVENOUS EVERY 8 HOURS PRN
Status: DISCONTINUED | OUTPATIENT
Start: 2025-05-22 | End: 2025-05-23

## 2025-05-22 RX ORDER — HYDROMORPHONE HYDROCHLORIDE 1 MG/ML
0.4 INJECTION, SOLUTION INTRAMUSCULAR; INTRAVENOUS; SUBCUTANEOUS EVERY 2 HOUR PRN
Status: DISCONTINUED | OUTPATIENT
Start: 2025-05-22 | End: 2025-05-23

## 2025-05-22 RX ORDER — HYDROMORPHONE HYDROCHLORIDE 1 MG/ML
0.2 INJECTION, SOLUTION INTRAMUSCULAR; INTRAVENOUS; SUBCUTANEOUS EVERY 2 HOUR PRN
Status: DISCONTINUED | OUTPATIENT
Start: 2025-05-22 | End: 2025-05-23

## 2025-05-22 RX ORDER — OXYCODONE HYDROCHLORIDE 5 MG/1
10 TABLET ORAL ONCE AS NEEDED
Status: DISCONTINUED | OUTPATIENT
Start: 2025-05-22 | End: 2025-05-22 | Stop reason: HOSPADM

## 2025-05-22 RX ORDER — ONDANSETRON 2 MG/ML
4 INJECTION INTRAMUSCULAR; INTRAVENOUS EVERY 6 HOURS PRN
Status: DISCONTINUED | OUTPATIENT
Start: 2025-05-22 | End: 2025-05-22 | Stop reason: HOSPADM

## 2025-05-22 RX ORDER — CELECOXIB 200 MG/1
200 CAPSULE ORAL DAILY
COMMUNITY

## 2025-05-22 RX ORDER — DEXAMETHASONE SODIUM PHOSPHATE 4 MG/ML
VIAL (ML) INJECTION AS NEEDED
Status: DISCONTINUED | OUTPATIENT
Start: 2025-05-22 | End: 2025-05-22 | Stop reason: SURG

## 2025-05-22 RX ORDER — SODIUM PHOSPHATE, DIBASIC AND SODIUM PHOSPHATE, MONOBASIC 7; 19 G/230ML; G/230ML
1 ENEMA RECTAL ONCE AS NEEDED
Status: DISCONTINUED | OUTPATIENT
Start: 2025-05-22 | End: 2025-05-23

## 2025-05-22 RX ORDER — PHENYLEPHRINE HCL 10 MG/ML
VIAL (ML) INJECTION AS NEEDED
Status: DISCONTINUED | OUTPATIENT
Start: 2025-05-22 | End: 2025-05-22 | Stop reason: SURG

## 2025-05-22 RX ORDER — NALOXONE HYDROCHLORIDE 0.4 MG/ML
0.08 INJECTION, SOLUTION INTRAMUSCULAR; INTRAVENOUS; SUBCUTANEOUS AS NEEDED
Status: DISCONTINUED | OUTPATIENT
Start: 2025-05-22 | End: 2025-05-22 | Stop reason: HOSPADM

## 2025-05-22 RX ORDER — DOCUSATE SODIUM 100 MG/1
100 CAPSULE, LIQUID FILLED ORAL 2 TIMES DAILY
Status: DISCONTINUED | OUTPATIENT
Start: 2025-05-22 | End: 2025-05-23

## 2025-05-22 RX ORDER — VERAPAMIL HYDROCHLORIDE 240 MG/1
240 TABLET, FILM COATED, EXTENDED RELEASE ORAL 2 TIMES DAILY
Status: DISCONTINUED | OUTPATIENT
Start: 2025-05-22 | End: 2025-05-22

## 2025-05-22 RX ORDER — SENNOSIDES 8.6 MG
17.2 TABLET ORAL NIGHTLY
Status: DISCONTINUED | OUTPATIENT
Start: 2025-05-22 | End: 2025-05-23

## 2025-05-22 RX ORDER — HYDROMORPHONE HYDROCHLORIDE 1 MG/ML
0.6 INJECTION, SOLUTION INTRAMUSCULAR; INTRAVENOUS; SUBCUTANEOUS EVERY 5 MIN PRN
Status: DISCONTINUED | OUTPATIENT
Start: 2025-05-22 | End: 2025-05-22 | Stop reason: HOSPADM

## 2025-05-22 RX ORDER — SODIUM CHLORIDE, SODIUM LACTATE, POTASSIUM CHLORIDE, CALCIUM CHLORIDE 600; 310; 30; 20 MG/100ML; MG/100ML; MG/100ML; MG/100ML
INJECTION, SOLUTION INTRAVENOUS CONTINUOUS
Status: DISCONTINUED | OUTPATIENT
Start: 2025-05-22 | End: 2025-05-22 | Stop reason: HOSPADM

## 2025-05-22 RX ADMIN — MIDAZOLAM HYDROCHLORIDE 2 MG: 1 INJECTION INTRAMUSCULAR; INTRAVENOUS at 07:03:00

## 2025-05-22 RX ADMIN — PHENYLEPHRINE HCL 100 MCG: 10 MG/ML VIAL (ML) INJECTION at 08:27:00

## 2025-05-22 RX ADMIN — TRANEXAMIC ACID 1000 MG: 10 INJECTION, SOLUTION INTRAVENOUS at 07:16:00

## 2025-05-22 RX ADMIN — PHENYLEPHRINE HCL 100 MCG: 10 MG/ML VIAL (ML) INJECTION at 08:10:00

## 2025-05-22 RX ADMIN — SODIUM CHLORIDE, SODIUM LACTATE, POTASSIUM CHLORIDE, CALCIUM CHLORIDE: 600; 310; 30; 20 INJECTION, SOLUTION INTRAVENOUS at 07:02:00

## 2025-05-22 RX ADMIN — DEXAMETHASONE SODIUM PHOSPHATE 8 MG: 4 MG/ML VIAL (ML) INJECTION at 07:15:00

## 2025-05-22 RX ADMIN — LIDOCAINE HYDROCHLORIDE 50 MG: 10 INJECTION, SOLUTION EPIDURAL; INFILTRATION; INTRACAUDAL; PERINEURAL at 07:10:00

## 2025-05-22 NOTE — CM/SW NOTE
05/22/25 1500   CM/SW Referral Data   Referral Source Social Work (self-referral)   Reason for Referral Discharge planning   Informant EMR;Clinical Staff Member   Discharge Needs   Anticipated D/C needs Home health care       Patient is a 68 y/o woman admitted s/p THR.  Pt with pre[facility]operative plan for [facility] [place].  PT recommending [facility] services at discharge.  Brenda from [facility] confirmed pt accepted for [facility] services at [place].  No other [place] needs/concerns identified at this time.  / to remain available for support and/or discharge planning.     Dea Hughes, [facility]  Discharge Planner  [phone]

## 2025-05-22 NOTE — ANESTHESIA PROCEDURE NOTES
Spinal Block    Date/Time: 5/22/2025 7:03 AM    Performed by: Yang Zhang MD  Authorized by: Yang Zhang MD      General Information and Staff    Start Time:  5/22/2025 7:03 AM  End Time:  5/22/2025 7:08 AM  Anesthesiologist:  Yang Zhang MD  Performed by:  Anesthesiologist  Patient Location:  OR  Site identification: surface landmarks    Preanesthetic Checklist: patient identified, IV checked, risks and benefits discussed, monitors and equipment checked, pre-op evaluation, timeout performed, anesthesia consent and sterile technique used      Procedure Details    Patient Position:  Sitting  Prep: ChloraPrep    Monitoring:  Cardiac monitor, heart rate and continuous pulse ox  Approach:  Midline  Location:  L3-4  Injection Technique:  Single-shot    Needle    Needle Type:  Sprotte  Needle Gauge:  24 G  Needle Length:  3.5 in    Assessment    Sensory Level:   Events: clear CSF, CSF aspirated, well tolerated and blood negative      Additional Comments

## 2025-05-22 NOTE — CONSULTS
[facility] H&P/Consult note       CC: post op med management  Asked to see pt by Dr Moreno      PCP: Osiel Bautista MD    History of Present Illness: Patient is a 69 year old male with PMH sig for SSS sp PPM, chronic AF, HTN, HLD, here for scheduled orthopedic surgery  Post op pain is controlled but was noted to be in AF RVR, HR in 140s  Took Verapamil this am and about 1 hr ago  Took bb about 30 min ago  Denied any cp, palps, dizziness. Doesn't feel any different than baseline    PMH  Past Medical History[1]     PSH  Past Surgical History[2]     ALL:  Allergies[3]     Home Medications:  Medications Taking[4]      Soc Hx  Social History     Tobacco Use    Smoking status: Former     Types: Cigars     Passive exposure: Never    Smokeless tobacco: Never    Tobacco comments:     1-2 cigars in a year   Substance Use Topics    Alcohol use: Yes     Alcohol/week: 1.0 standard drink of alcohol     Types: 1 Standard drinks or equivalent per week        Fam Hx  Family History[5]    Review of Systems  Comprehensive ROS reviewed and negative except for what's stated above.  Including negative for chest pain, shortness of breath, syncope.       OBJECTIVE:  BP (!) 163/103 (BP Location: Right arm)   Pulse 87   Temp 97.6 °F (36.4 °C) (Oral)   Resp 18   Ht 5' 8\" (1.727 m)   Wt 251 lb 5.2 oz (114 kg)   SpO2 99%   BMI 38.21 kg/m²   General:  Alert, no distress, appears stated age.   Head:  Normocephalic .   Eyes:  Sclera anicteric,    Throat: MMM   Neck: Supple    Lungs:   Clear to auscultation bilaterally. Normal effort   Chest wall:  No tenderness or deformity.   Heart:  irregular    Abdomen:   Soft, NT/ND, +bs   Extremities: Atraumatic, no cyanosis or edema.   Skin: No visible rashes or lesions.    Neurologic: Normal strength, no focal deficit appreciated     Diagnostic Data:    CBC/Chem  Recent Labs   Lab 05/22/25  0607   INR 1.20       No results for input(s): \"NA\", \"K\", \"CL\", \"CO2\", \"BUN\", \"CREATSERUM\", \"GLU\",  \"CA\", \"CAION\", \"MG\", \"PHOS\" in the last 168 hours.    No results for input(s): \"ALT\", \"AST\", \"ALB\", \"AMYLASE\", \"LIPASE\", \"LDH\" in the last 168 hours.    Invalid input(s): \"ALPHOS\", \"TBIL\", \"DBIL\", \"TPROT\"    No results for input(s): \"TROP\" in the last 168 hours.   EKG - afib rvr    Radiology: XR HIP W OR WO PELVIS 1 VIEW, LEFT (CPT=73501)  Result Date: 5/22/2025  PROCEDURE:  XR HIP W OR WO PELVIS 1 VIEW, LEFT (CPT=73501)  TECHNIQUE:  Unilateral view of the hip.  COMPARISON:  [facility] , XR, XR FLUOROSCOPY C-ARM TIME LESS THAN 1 HOUR (CPT=76000), 5/22/2025, 8:00 AM.  INDICATIONS:  Status post total left hip arthroplasty.  PATIENT STATED HISTORY: (As transcribed by Technologist)  Patient offered no additional history at this time.    FINDINGS:  There is a total left hip arthroplasty which appears adequately position.  Mild soft tissue air and swelling noted along the operative bed.  No postoperative complications.  No acute process.            CONCLUSION:  Total left hip arthroplasty appears within normal limits.  No acute process.   LOCATION:  [facility]   Dictated by (CST): James Okeefe DO on 5/22/2025 at 2:38 PM     Finalized by (CST): James Okeefe DO on 5/22/2025 at 2:39 PM       XR FLUOROSCOPY C-ARM TIME LESS THAN 1 HOUR (CPT=76000)  Result Date: 5/22/2025  PROCEDURE:  XR FLUOROSCOPY C-ARM TIME <1 HOUR  (CPT=76000)  INDICATIONS:  Total left hip arthroplasty performed by the attending surgeon under fluoroscopic guidance.  COMPARISON:  None.   TECHNIQUE:  FLUOROSCOPY IMAGES OBTAINED:  6 FLUOROSCOPY TIME:  27 seconds TECHNOLOGIST TIME:  1 hour 30 minutes RADIATION DOSE (AIR KERMA PRODUCT):  10.522mGy   FINDINGS:  Multiple fluoroscopic images submitted by the surgeon during the performance of a total left hip arthroplasty.            CONCLUSION:  As above.   LOCATION:  [facility]    Dictated by (CST): James Okeefe DO on 5/22/2025 at 10:46 AM     Finalized by (CST): James Okeefe DO on 5/22/2025 at 10:46 AM           ASSESSMENT / PLAN:   Patient is a 69 year old male with PMH sig for SSS sp PPM, chronic AF, HTN, HLD, here for scheduled orthopedic surgery      Impression    -sp L FARIDEH  -post op pain    -HTN  -HLD  -CAD  -permanent AF, now with RVR  -SSS sp PPM    -ORI  -obestiy bmi 38    Plan    *ortho  -post op surgical management per ortho    *CV  -resume verapemil, bb  -HR remains elevated, will have cards see  -cont statin    *chronic conditions  -home meds as able    Scds    Further recommendations pending patient's clinical course. [facility] hospitalist to continue to follow patient while in house    Patient and/or patient's family given opportunity to ask questions and note understanding and agreeing with therapeutic plan as outlined    Pankaj Hanson Hospitalist  [phone]  Answering Service: [phone]           [1]   Past Medical History:   Arrhythmia    Atrial fibrillation (HCC)    CHF (congestive heart failure) (HCC)    Congestive heart disease (HCC)    High blood pressure    High cholesterol    Hypertension    Long term (current) use of anticoagulants    Mild mitral regurgitation    ORI (obstructive sleep apnea)    Osteoarthritis    Sleep apnea    history    Visual impairment   [2]   Past Surgical History:  Procedure Laterality Date    Cardiac defibrillator placement      Cardiac pacemaker placement      Medtronic. EP: Dr Dior    Colonoscopy N/A 01/19/2017    Procedure: COLONOSCOPY;  Surgeon: Fidel Cuenca MD;  Location:  ENDOSCOPY    Colonoscopy      Eye surgery      Other surgical history      Pacemaker   [3] No Known Allergies  [4]   Outpatient Medications Marked as Taking for the 5/22/25 encounter (Hospital Encounter)   Medication Sig Dispense Refill    oxyCODONE 5 MG Oral Tab Take 0.5-1 tablets (2.5-5 mg total) by mouth every 4 (four) hours as needed for Pain. Post op      traMADol 50 MG Oral Tab Take 1 tablet (50 mg total) by mouth every 4 to 6 hours as needed for Pain.      acetaminophen  500 MG Oral Tab Take 2 tablets (1,000 mg total) by mouth every 8 (eight) hours. Post op      celecoxib 200 MG Oral Cap Take 1 capsule (200 mg total) by mouth daily. Post op      metoprolol tartrate 100 MG Oral Tab Take 1 tablet (100 mg total) by mouth every morning.      metoprolol tartrate 100 MG Oral Tab Take 0.5 tablets (50 mg total) by mouth every evening.      warfarin 2 MG Oral Tab Take 1 tablet (2 mg total) by mouth See Admin Instructions. Takes 2 mg 5 days/ week every Sunday, Monday, Tuesday, Thursday and Friday.      warfarin 4 MG Oral Tab Take 1 tablet (4 mg total) by mouth twice a week. 4 mg 2x/week every Wednesday and Saturday.      metFORMIN 850 MG Oral Tab Take 1 tablet (850 mg total) by mouth in the morning, at noon, and at bedtime.      fenofibrate 145 MG Oral Tab Take 1 tablet (145 mg total) by mouth daily.      pravastatin 40 MG Oral Tab Take 1 tablet (40 mg total) by mouth nightly.      naltrexone 50 MG Oral Tab Take 1 tablet (50 mg total) by mouth daily.      ibuprofen 800 MG Oral Tab Take 1 tablet (800 mg total) by mouth every 6 (six) hours as needed for Pain.      verapamil  MG Oral Tab CR Take 1 tablet (240 mg total) by mouth 2 (two) times daily. 180 tablet 0   [5]   Family History  Problem Relation Age of Onset    Cancer Father         Esophageal    Other (Other) Mother         Heart Failer.

## 2025-05-22 NOTE — ANESTHESIA POSTPROCEDURE EVALUATION
[facility]    Chuck Goodrich Patient Status:  Outpatient in a Bed   Age/Gender 69 year old male MRN [id number]   Location [facility] SURGERY Attending Rey Moreno MD   Hosp Day # 0 PCP Osiel Bautista MD       Anesthesia Post-op Note    LEFT ANTERIOR TOTAL HIP ARTHROPLASTY    Procedure Summary       Date: 05/22/25 Room / Location:  MAIN OR  /  MAIN OR    Anesthesia Start: 0701 Anesthesia Stop: 0905    Procedure: LEFT ANTERIOR TOTAL HIP ARTHROPLASTY (Left) Diagnosis: (PRIMARY OSTEOARTHRITIS)    Surgeons: Rey Moreno MD Anesthesiologist: Yang Zhang MD    Anesthesia Type: spinal ASA Status: Not recorded            Anesthesia Type: spinal    Vitals Value Taken Time   BP 91/64 05/22/25 09:05   Temp 97.8 °F (36.6 °C) 05/22/25 09:05   Pulse 65 05/22/25 09:05   Resp 18 05/22/25 09:05   SpO2 95 % 05/22/25 09:05           Patient Location: PACU    Anesthesia Type: spinal    Airway Patency: patent    Postop Pain Control: adequate    Mental Status: mildly sedated but able to meaningfully participate in the post-anesthesia evaluation    Nausea/Vomiting: none    Cardiopulmonary/Hydration status: stable euvolemic    Complications: no apparent anesthesia related complications    Postop vital signs: stable    Dental Exam: Unchanged from Preop    Patient to be discharged from PACU when criteria met.

## 2025-05-22 NOTE — CONSULTS
[facility] Dosing Service  Warfarin (Coumadin) Initial Dosing    Chuck Goodrich is a 69 year old patient for whom pharmacy has been consulted to dose warfarin (COUMADIN) for VTE(DVT and or PE ) prophylaxis by   Based on this indication, goal INR is 2-3.    Pertinent Patient Medical History: Afib,CHF  Potential Drug Interactions:  Tramadol    Latest INR:   Recent Labs     05/22/25  0607   INR 1.20       Other Anticoagulants:  None  Home regimen (if applicable):  Warfarin 4 mg  We and Sa and Warfarin 2 mg rest of the week  Date/Time last dose was given (if applicable): Warfarin 4mg @5/17    Based on above -  1.  For today, Give warfarin (COUMADIN) 4 mg at 2100 tonight    2.  PT/INR ordered daily while on warfarin    3.  Pharmacy will continue to follow.  We appreciate the opportunity to assist in the care of this patient.    Cherelle Hayes [facility]  5/22/2025  11:20 AM

## 2025-05-22 NOTE — PROGRESS NOTES
NURSING ADMISSION NOTE      Patient admitted via Cart  Oriented to room.  Safety precautions initiated.  Bed in low position.  Call light in reach.  Pt is aaox4, admitted from PACU, S/P left hip arthroplasty, dressing on left hip dry, intact, clean, complains of pain, tylenol and toradol scheduled given.    Pt's 's-150's, asymptomatic, 's, placed on tele showed afib rvr, EKG done showed afib rvr, metoprolol given early, Dr. Gutierrez informed and seen pt, spoke with Dr. Petit for consult, at latest tele shows controlled afib, monitor.

## 2025-05-22 NOTE — PHYSICAL THERAPY NOTE
PHYSICAL THERAPY JOINT EVALUATION - INPATIENT     Room Number: 371/371-A  Evaluation Date: 5/22/2025  Type of Evaluation: Initial  Physician Order: PT Eval and Treat    Presenting Problem: s/p L FARIDEH on 5/22/25  Co-Morbidities : afib, CHF, OA, HTN  Reason for Therapy: Mobility Dysfunction and Discharge Planning    PHYSICAL THERAPY ASSESSMENT   Patient is a 69 year old male admitted 5/22/2025 for L FARIDEH.  Prior to admission, patient's baseline is independent with no AD.  Patient is currently functioning below baseline with bed mobility, transfers, gait, and stair negotiation.  Patient is requiring supervision and contact guard assist as a result of the following impairments: decreased functional strength, pain, impaired   balance, and limited   ROM.  Physical Therapy will continue to follow for duration of hospitalization.    Patient will benefit from continued skilled PT Services at discharge to promote prior level of function.  Anticipate patient will return home with home health PT.    PLAN DURING HOSPITALIZATION  Nursing Mobility Recommendation : 1 Assist  PT Device Recommendation: Rolling walker  PT Treatment Plan: Bed mobility, Gait training, Endurance, Energy conservation, Patient education, Family education, Range of motion, Stoop training, Strengthening, Stair training, Transfer training, Balance training  Rehab Potential : Good  Frequency (Obs): Daily     CURRENT GOALS  Goal #1  Patient is able to demonstrate supine - sit EOB @ level: supervision     Goal #2  Patient is able to demonstrate transfers Sit to/from Stand at assistance level: supervision   Goal #3    Patient is able to ambulate 150 feet with assistive device at assistance level: supervision   Goal #4    Patient will negotiate 4 stairs/one curb w/ assistive device and supervision   Goal #5    Patient verbalizes and/or demonstrates all precautions and safety concerns independently    Goal #6      Goal Comments: Goals established on  5/22/2025    PHYSICAL THERAPY MEDICAL/SOCIAL HISTORY  History related to current admission: Patient is a 69 year old male admitted on 5/22/2025 from home for L FARIDEH.    HOME SITUATION  Type of Home: House   Home Layout: Two level        Stairs to Bedroom:  (flight)  Railing: Yes  Lives With: Spouse  Drives: Yes  Patient Regularly Uses: None (has a RW)    Prior Level of [place]: Pt is typically independent with ADLs and ambulates with no AD.     SUBJECTIVE  \"The only pain is at the incision\"     OBJECTIVE  Precautions: Bed/chair alarm (no hip restrictions)  Fall Risk: Standard fall risk    WEIGHT BEARING RESTRICTION  L Lower Extremity: Weight Bearing as Tolerated    PAIN ASSESSMENT  Rating: Unable to rate  Location: hip incision  Management Techniques: Activity promotion, Relaxation, Repositioning    COGNITION  Overall Cognitive Status:  WFL - within functional limits    RANGE OF MOTION AND STRENGTH ASSESSMENT  Upper extremity ROM and strength are within functional limits     Lower extremity ROM is within functional limits, except LLE s/p FARIDEH    Lower extremity strength is within functional limits, except LLE s/p FARIDEH      BALANCE  Static Sitting: Good  Dynamic Sitting: Good  Static Standing: Fair -  Dynamic Standing: Fair -    ADDITIONAL TESTS                                    ACTIVITY TOLERANCE   HR in the 130-140s at rest and with activity, RN notified.                       O2 WALK       NEUROLOGICAL FINDINGS                        AM-PAC '6-Clicks' INPATIENT SHORT FORM - BASIC MOBILITY  How much difficulty does the patient currently have...  Patient Difficulty: Turning over in bed (including adjusting bedclothes, sheets and blankets)?: None   Patient Difficulty: Sitting down on and standing up from a chair with arms (e.g., wheelchair, bedside commode, etc.): A Little   Patient Difficulty: Moving from lying on back to sitting on the side of the bed?: None   How much help from another person does the patient  currently need...   Help from Another: Moving to and from a bed to a chair (including a wheelchair)?: A Little   Help from Another: Need to walk in hospital room?: A Little   Help from Another: Climbing 3-5 steps with a railing?: A Little       AM-PAC Score:  Raw Score: 20   Approx Degree of Impairment: 35.83%   Standardized Score (AM-PAC Scale): 47.67   CMS Modifier (G-Code): CJ    FUNCTIONAL ABILITY STATUS  Gait Assessment   Functional Mobility/Gait Assessment  Gait Assistance: Contact guard assist, Supervision  Distance (ft): 100  Assistive Device: Rolling walker  Pattern: L Decreased stance time    Skilled Therapy Provided: Per RN okay to work with pt. Pt received in supine and was agreeable to PT session.     Bed Mobility:  Rolling: NT  Supine to sit: mod ind   Sit to supine: NT     Transfer Mobility:  Sit to stand: CGA   Stand to sit: supervision  Gait = pt ambulated with RW and CGA that progressed to supervision    Pt ambulated in the kaur and to/from the bathroom.     Therapist's Comments: Pt educated on role of therapy, goals for session, safety, fall prevention, no hip restrictions, WBAT, and activity recommendations.     Exercise/Education Provided:  Bed mobility  Energy conservation  Functional activity tolerated  Gait training  Strengthening  Transfer training    Patient End of Session: Up in chair, Needs met, Call light within reach, RN aware of session/findings, All patient questions and concerns addressed, Hospital anti-slip socks, SCDs in place, Alarm set    Patient Evaluation Complexity Level:  History Moderate - 1 or 2 personal factors and/or co-morbidities   Examination of body systems Low -  addressing 1-2 elements   Clinical Presentation Low- Stable   Clinical Decision Making Low Complexity       PT Session Time: 25 minutes  Therapeutic Activity: 8 minutes

## 2025-05-22 NOTE — INTERVAL H&P NOTE
Pre-op Diagnosis: PRIMARY OSTEOARTHRITIS    The above referenced H&P was reviewed by Rey Moreno MD on 5/22/2025, the patient was examined and no significant changes have occurred in the patient's condition since the H&P was performed.  I discussed with the patient and/or legal representative the potential benefits, risks and side effects of this procedure; the likelihood of the patient achieving goals; and potential problems that might occur during recuperation.  I discussed reasonable alternatives to the procedure, including risks, benefits and side effects related to the alternatives and risks related to not receiving this procedure.  We will proceed with procedure as planned.

## 2025-05-22 NOTE — OPERATIVE REPORT
OPERATIVE REPORT    Facility:  [facility]    Patient Name:  Chuck Goodrich    Age/Gender:  69 year old male  :  1955    MRN:  [id number]    Date of Operation:  2025    Preoperative Diagnosis:  LEFT HIP OSTEOARTHRITIS    Postoperative Diagnosis:  LEFT HIP OSTEOARTHRITIS    Procedure Performed:  LEFT TOTAL HIP ARTHROPLASTY    Surgeon:  TONY LYONS M.D.    Assistant:    Mateo Jack, MSN, FNP-BC      Anesthesia:  EPIDURAL    Estimated Blood Loss:  150cc    Drain:  NONE    Complications:  NONE    Implants:   1.  Biomet G7 acetabular shell, size 54 mm   2.  Neutral Vivacit-E highly cross-linked polyethylene liner   3.  Keyana Avenir Complete femoral stem, size 6.5, high offset   4.  40 mm, 0 Delta ceramic femoral head      Indications for Procedure:  Chuck Goodrich is a 69 year old male with osteoarthritis of the left hip who failed conservative management.  After consultation in the office and discussion regarding risks and benefits of surgical treatment, surgery was scheduled and informed consent obtained.      Description of Procedure:  The patient was taken to the operating room after the correct surgical site was marked in the preop holding area.  The patient was placed under anesthesia and placed in a supine position on the [facility] orthopaedic table.  Preoperative antibiotics were given.  All bony prominences were padded.  The left hip was prepped and draped in usual, sterile surgical fashion.  Bony landmarks were palpated for incision and a direct anterior approach was performed to the hip between the interval of tensor fascia aaliyah and sartorius/rectus femoris.  Lateral femoral circumflex vessels were identified, isolated and cauterized.  An L-shaped capsulotomy was performed and the capsule was tagged for retraction.  Retractors were placed inside the hip capsule and further capsular release was performed inside the saddle of the femur as well as down the medial aspect  of the femoral neck.  Osteotomy of the femoral neck was performed using a sagittal saw.  A corkscrew was used to remove the femoral head.  Retractors were placed anterior and posterior to the acetabulum.  The hip labrum as well as the soft tissue in the cotyloid fossa were removed in their entirety.  Reaming was then performed using flouroscopy to assess depth, anteversion and abduction angle.  The acetabular shell was then inserted again using flouroscopy to assess abduction angle, anteversion and complete seating of the acetabular component.  A neutral polyethylene was then inserted and impacted without difficulty.    Attention was then turned to the femur where the femoral hook was placed around the femur just distal to the vastus tubercle and proximal to the tendon of the gluteus andrew.  The leg was then externally rotated, extended and adducted with traction completely released.  The mechanical lift arm on the table was used to hold the proximal femur carefully.  Capsule was then further released inside the trochanter until the entire inside of the greater trochanter was easily visualized.  The lateral femoral neck remnant was removed and the femur was broached.  After broaching up to a proper size, trial head and neck components were used to determine stem offset and head length.  Flouroscopy was used to verify the position of the broach as well as the length and offset to determine final implant positions. The hip was taken through a range of motion and noted to be stable in up to 90* (degrees) external rotation along with up to 50 *(degrees) of extension. The trials were removed and permanent femoral components were opened and inserted.  There were no complications with insertion of the femoral components.  The hip was then reduced under direct visualization.  Flouroscopy again verified length, offset, implant position and stem fill of the femoral canal.  Flouro also verified there were no iatrogenic  fractures.  The wound was copiously irrigated (including with dilute betadine solution) and the tag sutures in the capsule were tied together closing the anterior hip capsule.  Local anesthetic and pain medicine was injected into the hip capsule and surrounding soft tissues.  The fascia aaliyah was closed with absorbable suture.  Skin was then closed in 2 layers with absorbable suture.  Dermabond was applied to the wound and allowed to dry. Then a few nylon sutures were placed at the superior portion to help relieve tension given patient panus, before sterile dressings were applied.  There were no complications and the procedure went as planned.  The surgical assistant was present for the entire procedure and assisted with the approach, exposure, definitive surgery and closure.  The patient left the operating room in stable condition.      TONY LYONS M.D.

## 2025-05-23 VITALS
OXYGEN SATURATION: 96 % | WEIGHT: 251.31 LBS | SYSTOLIC BLOOD PRESSURE: 138 MMHG | HEART RATE: 97 BPM | RESPIRATION RATE: 16 BRPM | DIASTOLIC BLOOD PRESSURE: 95 MMHG | TEMPERATURE: 98 F | BODY MASS INDEX: 38.09 KG/M2 | HEIGHT: 68 IN

## 2025-05-23 LAB
ATRIAL RATE: 136 BPM
INR BLD: 1.26 (ref 0.8–1.2)
PROTHROMBIN TIME: 15.9 SECONDS (ref 11.6–14.8)
Q-T INTERVAL: 326 MS
QRS DURATION: 90 MS
QTC CALCULATION (BEZET): 475 MS
R AXIS: 6 DEGREES
T AXIS: 194 DEGREES
VENTRICULAR RATE: 128 BPM

## 2025-05-23 PROCEDURE — 97116 GAIT TRAINING THERAPY: CPT

## 2025-05-23 PROCEDURE — 97535 SELF CARE MNGMENT TRAINING: CPT

## 2025-05-23 PROCEDURE — 97110 THERAPEUTIC EXERCISES: CPT

## 2025-05-23 PROCEDURE — 85610 PROTHROMBIN TIME: CPT | Performed by: ORTHOPAEDIC SURGERY

## 2025-05-23 PROCEDURE — 97530 THERAPEUTIC ACTIVITIES: CPT

## 2025-05-23 PROCEDURE — 97165 OT EVAL LOW COMPLEX 30 MIN: CPT

## 2025-05-23 NOTE — PROGRESS NOTES
Progress Note    Patient: Chuck Goodrich  Medical record #: [id number]    Chuck Goodrich is a 69 year old  male who is POD #1 left FARIDEH.  The patient's main complaint today is surgical pain at the operative site. Denies paresthesias/CP/SOA. Patient has been working with physical therapy.      Hospital Problem List:  Active Problems:    Pre-op testing      Current Medications:  Current Medications[1]      Input/Output:    Intake/Output Summary (Last 24 hours) at 5/23/2025 0845  Last data filed at 5/23/2025 0510  Gross per 24 hour   Intake 1200 ml   Output 1000 ml   Net 200 ml       Vital signs:  Blood pressure 120/78, pulse 82, temperature 97.8 °F (36.6 °C), temperature source Oral, resp. rate 17, height 5' 8\" (1.727 m), weight 251 lb 5.2 oz (114 kg), SpO2 96%.    Physical Exam:     Left Leg:  Dressing: clean and dry  Able to dorsiflex ankle and move toes  Sensation grossly intact to light touch throughout  Distal pulses intact  No calf swelling  Daniela's sign negative  Compartments soft  No signs or symptoms of DVT or compartment syndrome      Labs:   Lab Results   Component Value Date    INR 1.26 05/23/2025         Assessment: 69 year old  male POD #1 Left FARIDEH    Plan:  Ayla-op Glucose Goal control <140  TEDs, SCDs, IS  mobilize with PT, WBAT on operative leg  pain controlled with meds  According to CHEST and AAOS guidelines, Coumadin for DVT prophylaxis due to bleeding concerns  Disposition: plan discharge today if doing well with PT, consider home vs rehab if needed    Follow up with Dr. Moreno in 2 weeks    Followed by Physician group for medical conditions to include Active Problems:    Pre-op testing        Signed:  CIERA Zhu  5/23/2025  8:45 AM         [1]    lactated ringers infusion   Intravenous Continuous    [COMPLETED] ceFAZolin (Ancef) 2g in 10mL IV syringe premix  2 g Intravenous Once    sodium chloride 0.9 % IV bolus 500 mL  500 mL Intravenous Once PRN    sennosides  (Senokot) tab 17.2 mg  17.2 mg Oral Nightly    docusate sodium (Colace) cap 100 mg  100 mg Oral BID    polyethylene glycol (PEG 3350) (Miralax) 17 g oral packet 17 g  17 g Oral Daily PRN    magnesium hydroxide (Milk of Magnesia) 400 MG/5ML oral suspension 30 mL  30 mL Oral Daily PRN    bisacodyl (Dulcolax) 10 MG rectal suppository 10 mg  10 mg Rectal Daily PRN    fleet enema (Fleet) rectal enema 133 mL  1 enema Rectal Once PRN    ondansetron (Zofran) 4 MG/2ML injection 4 mg  4 mg Intravenous Q6H PRN    metoclopramide (Reglan) 5 mg/mL injection 10 mg  10 mg Intravenous Q8H PRN    famotidine (Pepcid) tab 20 mg  20 mg Oral BID    Or    famotidine (Pepcid) 20 mg/2mL injection 20 mg  20 mg Intravenous BID    [] diphenhydrAMINE (Benadryl) 50 mg/mL  injection 25 mg  25 mg Intravenous Once PRN    diphenhydrAMINE (Benadryl) cap/tab 25 mg  25 mg Oral Q4H PRN    Or    diphenhydrAMINE (Benadryl) 50 mg/mL  injection 12.5 mg  12.5 mg Intravenous Q4H PRN    ferrous sulfate DR tab 325 mg  325 mg Oral Daily with breakfast    [COMPLETED] tranexamic acid in sodium chloride 0.7% (Cyklokapron) 1000 mg/100mL infusion premix 1,000 mg  1,000 mg Intravenous Once    [COMPLETED] ceFAZolin (Ancef) 2g in 10mL IV syringe premix  2 g Intravenous Q8H    tiZANidine (Zanaflex) partial tab 1 mg  1 mg Oral Q6H PRN    dexAMETHasone PF (Decadron) 10 MG/ML injection 8 mg  8 mg Intravenous Once    oxyCODONE immediate release partial tab 2.5 mg  2.5 mg Oral Q4H PRN    Or    oxyCODONE immediate release tab 5 mg  5 mg Oral Q4H PRN    HYDROmorphone (Dilaudid) 1 MG/ML injection 0.2 mg  0.2 mg Intravenous Q2H PRN    Or    HYDROmorphone (Dilaudid) 1 MG/ML injection 0.4 mg  0.4 mg Intravenous Q2H PRN    traMADol (Ultram) tab 50 mg  50 mg Oral 4 times per day    acetaminophen (Tylenol) tab 650 mg  650 mg Oral Q4H While awake    ketorolac (Toradol) 15 MG/ML injection 15 mg  15 mg Intravenous Q6H    [COMPLETED] warfarin (Coumadin) tab 4 mg  4 mg Oral Once at  night    metoprolol tartrate (Lopressor) tab 100 mg  100 mg Oral Daily Beta Blocker    metFORMIN (Glucophage) tab 850 mg  850 mg Oral TID    fenofibrate micronized (Lofibra) cap 134 mg  134 mg Oral Daily with breakfast    atorvastatin (Lipitor) tab 10 mg  10 mg Oral Nightly    pregabalin (Lyrica) cap 75 mg  75 mg Oral Daily    [COMPLETED] metoprolol tartrate (Lopressor) tab 50 mg  50 mg Oral Once    metoprolol tartrate (Lopressor) tab 50 mg  50 mg Oral QPM    verapamil ER (Calan-SR) tab 240 mg  240 mg Oral BID    [COMPLETED] povidone-iodine (Betadine) 10 % 17.5 mL in sodium chloride 0.9 % 500 mL irrigation   Irrigation Once (Intra-Op)    [COMPLETED] clonidine/epinephrine/ropivacaine/ketorolac in 0.9% NaCl 60 mL pain cocktail syringe for hip arthroplasty   Infiltration Once (Intra-Op)

## 2025-05-23 NOTE — DISCHARGE SUMMARY
[facility] Internal Medicine Hospitalist Discharge Summary     Patient ID:  Chuck Goodrich  69 year old  8/27/1955    Admission Date/Time  5/22/2025  5:18 AM  Discharge Date  05/23/25    PCP  Osiel Bautista MD     Discharging Hospitalist:  Pankaj Gutierrez DO    Disposition:  home    Follow Up Appointments  No follow-up provider specified.    Primary Hospital Problems/Hospital Course Summary  Scheduled L FARIDEH    Medication Changes       Important Follow Up Items  Labs:    Incidental Findings:      Procedures/Diagnostics  Sp FARIDEH    Operative Procedures:    LEFT TOTAL HIP ARTHROPLASTY     Change in Code Status:        Hospital Course/Secondary Diagnoses:      Patient is a 69 year old male with PMH sig for SSS sp PPM, chronic AF, HTN, HLD, here for scheduled orthopedic surgery       Impression     -sp L FARIDEH  -post op pain     -HTN  -HLD  -CAD  -permanent AF, now with RVR  -SSS sp PPM     -ORI  -obestiy bmi 38     Plan     *ortho  -post op surgical management per ortho     *CV  -resume verapemil, bb  -noted to be in AF RVR post op. Likely from surgery /reactive, HR now controlled      Consults: IP CONSULT TO CASE MANAGEMENT  IP CONSULT TO HOSPITALIST  IP CONSULT TO RESPIRATORY CARE  IP CONSULT TO SOCIAL WORK  IP CONSULT TO PHARMACY  IP CONSULT TO CARDIOLOGY    Discharge Medications       Medication List        CONTINUE taking these medications      acetaminophen 500 MG Tabs  Commonly known as: Tylenol Extra Strength     celecoxib 200 MG Caps  Commonly known as: CeleBREX     oxyCODONE 5 MG Tabs     pregabalin 75 MG Caps  Commonly known as: Lyrica     sennosides-docusate 8.6-50 MG Tabs  Commonly known as: Pericolace     * warfarin 2 MG Tabs  Commonly known as: Coumadin  Take as directed. If you are unsure how to take this medication, talk to your nurse or doctor.     * warfarin 4 MG Tabs  Commonly known as: Coumadin  Take as directed. If you are unsure  how to take this medication, talk to your nurse or doctor.           * This list has 2 medication(s) that are the same as other medications prescribed for you. Read the directions carefully, and ask your doctor or other care provider to review them with you.                STOP taking these medications      ibuprofen 800 MG Tabs  Commonly known as: Motrin            ASK your doctor about these medications      fenofibrate 145 MG Tabs  Commonly known as: Tricor     metFORMIN 850 MG Tabs  Commonly known as: Glucophage     * metoprolol tartrate 100 MG Tabs  Commonly known as: Lopressor  Ask about: Which instructions should I use?     * metoprolol tartrate 100 MG Tabs  Commonly known as: Lopressor  Ask about: Which instructions should I use?     naltrexone 50 MG Tabs  Commonly known as: ReVia     pravastatin 40 MG Tabs  Commonly known as: Pravachol     traMADol 50 MG Tabs  Commonly known as: Ultram     verapamil  MG Tbcr  Commonly known as: Calan-SR  Take 1 tablet (240 mg total) by mouth 2 (two) times daily.           * This list has 2 medication(s) that are the same as other medications prescribed for you. Read the directions carefully, and ask your doctor or other care provider to review them with you.                I reconciled current and discharge medications on the day of discharge.    Imaging/Diagnostic Reports  XR HIP W OR WO PELVIS 1 VIEW, LEFT (CPT=73501)  Result Date: 5/22/2025  PROCEDURE:  XR HIP W OR WO PELVIS 1 VIEW, LEFT (CPT=73501)  TECHNIQUE:  Unilateral view of the hip.  COMPARISON:  [facility] , XR, XR FLUOROSCOPY C-ARM TIME LESS THAN 1 HOUR (CPT=76000), 5/22/2025, 8:00 AM.  INDICATIONS:  Status post total left hip arthroplasty.  PATIENT STATED HISTORY: (As transcribed by Technologist)  Patient offered no additional history at this time.    FINDINGS:  There is a total left hip arthroplasty which appears adequately position.  Mild soft tissue air and swelling noted along the operative bed.  No  postoperative complications.  No acute process.            CONCLUSION:  Total left hip arthroplasty appears within normal limits.  No acute process.   LOCATION:  [facility]   Dictated by (CST): James Okeefe DO on 5/22/2025 at 2:38 PM     Finalized by (CST): James Okeefe DO on 5/22/2025 at 2:39 PM       XR FLUOROSCOPY C-ARM TIME LESS THAN 1 HOUR (CPT=76000)  Result Date: 5/22/2025  PROCEDURE:  XR FLUOROSCOPY C-ARM TIME <1 HOUR  (CPT=76000)  INDICATIONS:  Total left hip arthroplasty performed by the attending surgeon under fluoroscopic guidance.  COMPARISON:  None.   TECHNIQUE:  FLUOROSCOPY IMAGES OBTAINED:  6 FLUOROSCOPY TIME:  27 seconds TECHNOLOGIST TIME:  1 hour 30 minutes RADIATION DOSE (AIR KERMA PRODUCT):  10.522mGy   FINDINGS:  Multiple fluoroscopic images submitted by the surgeon during the performance of a total left hip arthroplasty.            CONCLUSION:  As above.   LOCATION:  [facility]    Dictated by (RUST): James Okeefe DO on 5/22/2025 at 10:46 AM     Finalized by (CST): James Okeefe DO on 5/22/2025 at 10:46 AM           Patient instructions:         Current Discharge Medication List        CONTINUE these medications which have NOT CHANGED    Details   oxyCODONE 5 MG Oral Tab Take 0.5-1 tablets (2.5-5 mg total) by mouth every 4 (four) hours as needed for Pain. Post op      traMADol 50 MG Oral Tab Take 1 tablet (50 mg total) by mouth every 4 to 6 hours as needed for Pain.      acetaminophen 500 MG Oral Tab Take 2 tablets (1,000 mg total) by mouth every 8 (eight) hours. Post op      celecoxib 200 MG Oral Cap Take 1 capsule (200 mg total) by mouth daily. Post op      !! metoprolol tartrate 100 MG Oral Tab Take 1 tablet (100 mg total) by mouth every morning.      !! metoprolol tartrate 100 MG Oral Tab Take 0.5 tablets (50 mg total) by mouth every evening.      !! warfarin 2 MG Oral Tab Take 1 tablet (2 mg total) by mouth See Admin Instructions. Takes 2 mg 5 days/ week every Sunday, Monday, Tuesday, Thursday  and Friday.      !! warfarin 4 MG Oral Tab Take 1 tablet (4 mg total) by mouth twice a week. 4 mg 2x/week every Wednesday and Saturday.      metFORMIN 850 MG Oral Tab Take 1 tablet (850 mg total) by mouth in the morning, at noon, and at bedtime.      fenofibrate 145 MG Oral Tab Take 1 tablet (145 mg total) by mouth daily.      pravastatin 40 MG Oral Tab Take 1 tablet (40 mg total) by mouth nightly.      naltrexone 50 MG Oral Tab Take 1 tablet (50 mg total) by mouth daily.      verapamil  MG Oral Tab CR Take 1 tablet (240 mg total) by mouth 2 (two) times daily.      pregabalin 75 MG Oral Cap Take 1 capsule (75 mg total) by mouth daily.      sennosides-docusate 8.6-50 MG Oral Tab Take 1 tablet by mouth daily. Post op       !! - Potential duplicate medications found. Please discuss with provider.        STOP taking these medications       ibuprofen 800 MG Oral Tab                   Exam on day of discharge:     Vitals:    05/23/25 0800   BP: 120/78   Pulse: 82   Resp: 17   Temp: 97.8 °F (36.6 °C)     No cp, dizziness, lh  No nv  Pain controlled    Physical Exam:   General: no acute distress  Heart: irregular   Lungs: CTAB  Abd: Soft, NT, ND       Coordinator contacted for appt    Total time coordinating care for discharge: Greater than 30 minutes    Pankaj Gutierrez DO

## 2025-05-23 NOTE — PHYSICAL THERAPY NOTE
PHYSICAL THERAPY TREATMENT NOTE - INPATIENT    Room Number: 371/371-A     Session: 1     Number of Visits to Meet Established Goals: 1    Presenting Problem: s/p L FARIDEH on 5/22/25  Co-Morbidities : afib, CHF, OA, HTN    PHYSICAL THERAPY ASSESSMENT   Patient demonstrates excellent progress this session, goals  updated to reflect patient performance.      Patient is requiring supervision and contact guard assist as a result of the following impairments: decreased functional strength and decreased muscular endurance.     Patient continues to function near baseline with bed mobility, transfers, gait, stair negotiation, and standing prolonged periods.    Patient continues to benefit from continued skilled PT services: at discharge to promote prior level of function.  Anticipate patient will return home with home health PT.    PLAN DURING HOSPITALIZATION  Nursing Mobility Recommendation : 1 Assist  PT Device Recommendation: Rolling walker  PT Treatment Plan: Bed mobility, Gait training, Endurance, Energy conservation, Patient education, Family education, Range of motion, Stoop training, Strengthening, Stair training, Transfer training, Balance training  Frequency (Obs): Daily     CURRENT GOALS       Goal #1  Patient is able to demonstrate supine - sit EOB @ level: supervision      Goal #2  Patient is able to demonstrate transfers Sit to/from Stand at assistance level: supervision   Goal #3     Patient is able to ambulate 150 feet with assistive device at assistance level: supervision   Goal #4     Patient will negotiate 4 stairs/one curb w/ assistive device and supervision   Goal #5     Patient verbalizes and/or demonstrates all precautions and safety concerns independently    Goal #6        Goal Comments: Goals established on 5/22/2025     PHYSICAL THERAPY MEDICAL/SOCIAL HISTORY  History related to current admission: Patient is a 69 year old male admitted on 5/22/2025 from home for L FARIDEH.     HOME SITUATION  Type of  Home: House   Home Layout: Two level  Stairs to Bedroom:  (flight)  Railing: Yes  Lives With: Spouse  Drives: Yes  Patient Regularly Uses: None (has a RW)     Prior Level of Moore: Pt is typically independent with ADLs and ambulates with no AD.   5/23/2025 all goals  achieved     SUBJECTIVE  \"My wife is bringing in my walker\"     OBJECTIVE  Precautions: Bed/chair alarm (no hip restrictions)    WEIGHT BEARING RESTRICTION  L Lower Extremity: Weight Bearing as Tolerated    PAIN ASSESSMENT   Rating: Unable to rate  Location: incision  Management Techniques: Activity promotion, Body mechanics, Relaxation, Repositioning    BALANCE                                                                                                                       Static Sitting: Good  Dynamic Sitting: Good           Static Standing: Fair  Dynamic Standing: Fair -    ACTIVITY TOLERANCE                         O2 WALK       AM-PAC '6-Clicks' INPATIENT SHORT FORM - BASIC MOBILITY  How much difficulty does the patient currently have...  Patient Difficulty: Turning over in bed (including adjusting bedclothes, sheets and blankets)?: None   Patient Difficulty: Sitting down on and standing up from a chair with arms (e.g., wheelchair, bedside commode, etc.): None   Patient Difficulty: Moving from lying on back to sitting on the side of the bed?: None   How much help from another person does the patient currently need...   Help from Another: Moving to and from a bed to a chair (including a wheelchair)?: None   Help from Another: Need to walk in hospital room?: None   Help from Another: Climbing 3-5 steps with a railing?: A Little     AM-PAC Score:  Raw Score: 23   Approx Degree of Impairment: 11.2%   Standardized Score (AM-PAC Scale): 56.93   CMS Modifier (G-Code): CI    FUNCTIONAL ABILITY STATUS  Gait Assessment   Functional Mobility/Gait Assessment  Gait Assistance: Supervision  Distance (ft): 200  Assistive Device: Rolling  walker  Pattern: L Decreased stance time  Stairs: Stairs, Stoop/curb, Car transfer  How Many Stairs: 4  Device: 2 Rails  Assist: Supervision  Pattern: Ascend and Descend  Ascend and Descend : Step to  Stoop/Curb: CGA (cues for sequencing)  Car transfer: supervision    Skilled Therapy Provided  Pt presents in BS chair .    Pt performed seated and standing therex per FARIDEH protocol. Pt gait trained c RW cues for reciprocal gait pattern, WBAT and proper integration of RW with good return demo.   Pt t/f trained as noted above c cues for sequencing.   Pt has  RW for home use. Pt left in chair, needs met. All questions and concerns addressed.        Bed Mobility:  Rolling:    Supine<>Sit:    Sit<>Supine:      Transfer Mobility:  Sit<>Stand: supervision    Stand<>Sit: supervision    Gait: supervision c RW     Therapist's Comments: FARIDEH HEP handout provided.  Pt educated on use of RW, as he tends to abandon it.   Pt inquiring about handicap parking sign. Referred to RN .    THERAPEUTIC EXERCISES  Lower Extremity Ankle pumps  Heel raises  Heel slides  Knee extension  LAQ  Standing rocks, standing knee flexion      Upper Extremity      Position Sitting & Standing     Repetitions   10-20   Sets   1     Patient End of Session: Up in chair, Needs met, Call light within reach, RN aware of session/findings, All patient questions and concerns addressed, Hospital anti-slip socks, Alarm set    PT Session Time: 38 minutes  Gait Training: 15 minutes  Therapeutic Activity: 15 minutes  Therapeutic Exercise: 8 minutes   Neuromuscular Re-education:  minutes

## 2025-05-23 NOTE — PLAN OF CARE
Patient A&Ox4, VSS on RA. Tele; Afib. Stable HR in 90's. Post op dressing intact. Gel ice as tolerated. Up with PT/OT. Spoke with pharmacy regarding coumadin dosing. [facility] states home doses to continue. Patient to follow up with coumadin clinic in 11 days for INR labs. Plan to DC home with [facility] today.

## 2025-05-23 NOTE — PROGRESS NOTES
Patient to discharge home with [facility] via family transport.  Educational video watched, patient verbalizes understanding.  Discharge education taught, patient verbalizes understanding.  Belongings sent with patient.

## 2025-05-23 NOTE — CONSULTS
Cardiology Consult Note    Chuck Goodrich Patient Status:  Outpatient in a Bed    1955 MRN [id number]   Location [facility] 3SW-A Attending Rey Moreno MD   Hosp Day # 0 PCP Osiel Bautista MD     Reason for Consultation:  Afib RVR    History of Present Illness:  Chuck Goodrich is a a(n) 69 year old male that is here for schedule orthopedic surgery.     Impression:   Afib  HLD  HTN  S/p L FARIDEH  CAD  S/p PPM    Plan :   Rates appear improved this AM - 's rate elevation likely from procedural stress and pain if rate control medications were not held prior to procedure.   Plan to continue metoprolol 100 mg daily and 50 mg nightly, and Verapamil 240 mg BID  Patient reports baseline HR at home is in 90's.   Reviewed that if HR maintaining over 110 plan to take an additional 50 mg of metoprolol PRN  Continue warfarin   Continue statin   Discharge pending Ortho recommendations.     I reviewed the history, discussed the patient in detail with the APN, evaluated the patient, and agree with the note above.    Impression:  AF: Rates acceptable, metoprolol.  Coumadin per prior dosing protocol.  CAD: Stable.  Medically manage.  Hip: Per ortho.  Doing well.  Appears ready to go home.    Ok to DC home from CV perspective.  FU as scheduled with Dr. Dior.       Dat Chandler MD  General, Interventional  Structural & Endovascular  Cardiology                Subjective:   No acute cardiac complaints.     History:  Past Medical History[1]  Past Surgical History[2]  Family History[3]   reports that he has quit smoking. His smoking use included cigars. He has never been exposed to tobacco smoke. He has never used smokeless tobacco. He reports current alcohol use of about 1.0 standard drink of alcohol per week. He reports that he does not use drugs.    Allergies:  Allergies[4]    Medications:  Current Hospital Medications[5]    Review of Systems:  All systems were reviewed and are negative  except as described above in HPI.    Physical Exam:  Blood pressure (!) 157/92, pulse 84, temperature 98 °F (36.7 °C), temperature source Oral, resp. rate 18, height 5' 8\" (1.727 m), weight 251 lb 5.2 oz (114 kg), SpO2 98%.  Temp (24hrs), Av.9 °F (36.6 °C), Min:97.6 °F (36.4 °C), Max:98.2 °F (36.8 °C)    Wt Readings from Last 3 Encounters:   25 251 lb 5.2 oz (114 kg)   02/15/24 284 lb (128.8 kg)   22 278 lb (126.1 kg)       Telemetry: Afib  General: Alert and oriented in no apparent distress.  HEENT: No focal deficits.  Neck: No JVD, carotids 2+ no bruits.  Cardiac: Regular rate and rhythm, S1, S2 normal, no murmur, rub or gallop.  Lungs: Clear without wheezes, rales, rhonchi or dullness.  Normal excursions and effort.  Abdomen: Soft, non-tender.   Extremities: Without clubbing, cyanosis or edema.  Peripheral pulses are 2+.  Neurologic: Alert and oriented, normal affect.  Skin: Warm and dry.     Laboratories and Data:  Diagnostics:  EKG: Atrial fibrillation with rapid ventricular response   ST & T wave abnormality, consider inferolateral ischemia   Abnormal ECG   When compared with ECG of 2017 11:09,   Atrial fibrillation has replaced Electronic ventricular pacemaker   Vent. rate has increased BY  68 BPM       Labs:      Lab Results   Component Value Date    INR 1.26 (H) 2025    INR 1.20 2025    INR 1.03 02/15/2024           Dahlia Vilchis, APRN  2025  8:25 AM         [1]   Past Medical History:   Arrhythmia    Atrial fibrillation (HCC)    CHF (congestive heart failure) (HCC)    Congestive heart disease (HCC)    High blood pressure    High cholesterol    Hypertension    Long term (current) use of anticoagulants    Mild mitral regurgitation    ORI (obstructive sleep apnea)    Osteoarthritis    Sleep apnea    history    Visual impairment   [2]   Past Surgical History:  Procedure Laterality Date    Cardiac defibrillator placement      Cardiac pacemaker placement      [employer].  EP: Dr Dior    Colonoscopy N/A 01/19/2017    Procedure: COLONOSCOPY;  Surgeon: Fidel Cuenca MD;  Location:  ENDOSCOPY    Colonoscopy      Eye surgery      Other surgical history      Pacemaker   [3]   Family History  Problem Relation Age of Onset    Cancer Father         Esophageal    Other (Other) Mother         Heart Failer.   [4] No Known Allergies  [5]   Current Facility-Administered Medications:     lactated ringers infusion, , Intravenous, Continuous    sodium chloride 0.9 % IV bolus 500 mL, 500 mL, Intravenous, Once PRN    sennosides (Senokot) tab 17.2 mg, 17.2 mg, Oral, Nightly    docusate sodium (Colace) cap 100 mg, 100 mg, Oral, BID    polyethylene glycol (PEG 3350) (Miralax) 17 g oral packet 17 g, 17 g, Oral, Daily PRN    magnesium hydroxide (Milk of Magnesia) 400 MG/5ML oral suspension 30 mL, 30 mL, Oral, Daily PRN    bisacodyl (Dulcolax) 10 MG rectal suppository 10 mg, 10 mg, Rectal, Daily PRN    fleet enema (Fleet) rectal enema 133 mL, 1 enema, Rectal, Once PRN    ondansetron (Zofran) 4 MG/2ML injection 4 mg, 4 mg, Intravenous, Q6H PRN    metoclopramide (Reglan) 5 mg/mL injection 10 mg, 10 mg, Intravenous, Q8H PRN    famotidine (Pepcid) tab 20 mg, 20 mg, Oral, BID **OR** famotidine (Pepcid) 20 mg/2mL injection 20 mg, 20 mg, Intravenous, BID    diphenhydrAMINE (Benadryl) cap/tab 25 mg, 25 mg, Oral, Q4H PRN **OR** diphenhydrAMINE (Benadryl) 50 mg/mL  injection 12.5 mg, 12.5 mg, Intravenous, Q4H PRN    ferrous sulfate DR tab 325 mg, 325 mg, Oral, Daily with breakfast    tiZANidine (Zanaflex) partial tab 1 mg, 1 mg, Oral, Q6H PRN    dexAMETHasone PF (Decadron) 10 MG/ML injection 8 mg, 8 mg, Intravenous, Once    oxyCODONE immediate release partial tab 2.5 mg, 2.5 mg, Oral, Q4H PRN **OR** oxyCODONE immediate release tab 5 mg, 5 mg, Oral, Q4H PRN    HYDROmorphone (Dilaudid) 1 MG/ML injection 0.2 mg, 0.2 mg, Intravenous, Q2H PRN **OR** HYDROmorphone (Dilaudid) 1 MG/ML injection 0.4 mg, 0.4 mg,  Intravenous, Q2H PRN    traMADol (Ultram) tab 50 mg, 50 mg, Oral, 4 times per day    acetaminophen (Tylenol) tab 650 mg, 650 mg, Oral, Q4H While awake    ketorolac (Toradol) 15 MG/ML injection 15 mg, 15 mg, Intravenous, Q6H    metoprolol tartrate (Lopressor) tab 100 mg, 100 mg, Oral, Daily Beta Blocker    metFORMIN (Glucophage) tab 850 mg, 850 mg, Oral, TID    fenofibrate micronized (Lofibra) cap 134 mg, 134 mg, Oral, Daily with breakfast    atorvastatin (Lipitor) tab 10 mg, 10 mg, Oral, Nightly    pregabalin (Lyrica) cap 75 mg, 75 mg, Oral, Daily    metoprolol tartrate (Lopressor) tab 50 mg, 50 mg, Oral, QPM    verapamil ER (Calan-SR) tab 240 mg, 240 mg, Oral, BID

## 2025-05-23 NOTE — PROGRESS NOTES
Pt A&O x 4 , on a , TELE-afib on coumadin, BM-/22, Pain controlled with current regimen,   up with x1ast, dressing-c/d/i, urinal at bedside, plan: cards to see today. Call light within reach.

## 2025-05-23 NOTE — OCCUPATIONAL THERAPY NOTE
OCCUPATIONAL THERAPY EVALUATION - INPATIENT    Room Number: 371/371-A  Evaluation Date: 5/23/2025     Type of Evaluation: Initial  Presenting Problem: L THR 5/22    Physician Order: IP Consult to Occupational Therapy  Reason for Therapy:  ADL/IADL Dysfunction and Discharge Planning      OCCUPATIONAL THERAPY ASSESSMENT   Patient met all OT goals at supervision level.    Patient reports no further questions/concerns at this time.   Discharge OT in hospital.          History: Patient is a 69 year old male admitted on 5/22/2025 with Presenting Problem: L THR 5/22. Co-Morbidities : afib, CHF, OA, HTN    WEIGHT BEARING RESTRICTION  Weight Bearing Restriction: L lower extremity           L Lower Extremity: Weight Bearing as Tolerated    Recommendations for nursing staff:   Transfers: supervision with RW  Toileting location: Toilet    EVALUATION SESSION:  ACTIVITY TOLERANCE   HR 130s with activity        EDUCATION PROVIDED  Patient Education : Role of Occupational Therapy; Plan of Care; Discharge Recommendations; Functional Transfer Techniques; Fall Prevention  Patient's Response to Education: Verbalized Understanding    PATIENT START OF SESSION: seated  FUNCTIONAL TRANSFER ASSESSMENT  Sit to Stand: Chair  Chair: Supervision  Toilet Transfer: Supervision    BED MOBILITY     BALANCE ASSESSMENT     FUNCTIONAL ADL ASSESSMENT  UB Dressing Seated: Independent  LB Dressing Seated: Supervision  Toileting Seated: Supervision (simulated)      EQUIPMENT USED: RW  Demonstrates functional use    THERAPIST COMMENTS: ADL/mobility as noted.     Patient End of Session: Up in chair, Needs met, Call light within reach, RN aware of session/findings, All patient questions and concerns addressed, Hospital anti-slip socks, Alarm set    OCCUPATIONAL PROFILE    HOME SITUATION  Type of Home: House  Home Layout: Two level  Lives With: Spouse    Toilet and Equipment: Standard height toilet  Shower/Tub and Equipment: Walk-in shower, Shower chair              Drives: Yes  Patient Regularly Uses: None (has a RW)    Prior Level of Function: Pt reports independence with ADL and ambulation without AD prior to admit.      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 : A Little  -   Putting on and taking off regular upper body clothing?: A Little  -   Taking care of personal grooming such as brushing teeth?: None  -   Eating meals?: None    AM-PAC Score:  Score: 20  Approx Degree of Impairment: 38.32%  Standardized Score (AM-PAC Scale): 42.03      ADDITIONAL TESTS     NEUROLOGICAL FINDINGS        PLAN   Patient has been evaluated and presents with no skilled Occupational Therapy needs at this time.  Patient discharged from Occupational Therapy services.  Please re-order if a new functional limitation presents during this admission.      Patient Evaluation Complexity Level:   Occupational Profile/Medical History LOW   Specific performance deficits impacting engagement in ADL/IADL LOW   Client Assessment/Performance Deficits LOW   Clinical Decision Making LOW   Overall Complexity LOW     OT Session Time: 25 minutes  Self-Care Home Management: 15 minutes  Therapeutic Activity:  minutes  Neuromuscular Re-education:  minutes  Therapeutic Exercise: minutes

## 2025-06-24 ENCOUNTER — HOSPITAL ENCOUNTER (OUTPATIENT)
Facility: HOSPITAL | Age: 70
Setting detail: OBSERVATION
Discharge: HOME | End: 2025-06-25
Attending: EMERGENCY MEDICINE | Admitting: HOSPITALIST
Payer: MEDICARE

## 2025-06-24 DIAGNOSIS — I48.91 ATRIAL FIBRILLATION WITH RVR (HCC): Primary | ICD-10-CM

## 2025-06-24 DIAGNOSIS — D64.9 ANEMIA, UNSPECIFIED TYPE: ICD-10-CM

## 2025-06-24 DIAGNOSIS — R79.1 SUPRATHERAPEUTIC INR: ICD-10-CM

## 2025-06-24 LAB
ALBUMIN SERPL-MCNC: 4.5 G/DL (ref 3.2–4.8)
ALBUMIN/GLOB SERPL: 1.7 {RATIO} (ref 1–2)
ALP LIVER SERPL-CCNC: 82 U/L (ref 45–117)
ALT SERPL-CCNC: 12 U/L (ref 10–49)
ANION GAP SERPL CALC-SCNC: 11 MMOL/L (ref 0–18)
APTT PPP: 133.5 SECONDS (ref 23–36)
AST SERPL-CCNC: 25 U/L (ref ?–34)
BASOPHILS # BLD AUTO: 0.02 X10(3) UL (ref 0–0.2)
BASOPHILS NFR BLD AUTO: 0.2 %
BILIRUB SERPL-MCNC: 0.4 MG/DL (ref 0.2–1.1)
BUN BLD-MCNC: 24 MG/DL (ref 9–23)
CALCIUM BLD-MCNC: 10.8 MG/DL (ref 8.7–10.6)
CHLORIDE SERPL-SCNC: 110 MMOL/L (ref 98–112)
CO2 SERPL-SCNC: 23 MMOL/L (ref 21–32)
CREAT BLD-MCNC: 1.29 MG/DL (ref 0.7–1.3)
EGFRCR SERPLBLD CKD-EPI 2021: 60 ML/MIN/1.73M2 (ref 60–?)
EOSINOPHIL # BLD AUTO: 0.11 X10(3) UL (ref 0–0.7)
EOSINOPHIL NFR BLD AUTO: 1.3 %
ERYTHROCYTE [DISTWIDTH] IN BLOOD BY AUTOMATED COUNT: 13.5 %
GLOBULIN PLAS-MCNC: 2.7 G/DL (ref 2–3.5)
GLUCOSE BLD-MCNC: 127 MG/DL (ref 70–99)
HCT VFR BLD AUTO: 35.1 % (ref 39–53)
HGB BLD-MCNC: 11.4 G/DL (ref 13–17.5)
IMM GRANULOCYTES # BLD AUTO: 0.03 X10(3) UL (ref 0–1)
IMM GRANULOCYTES NFR BLD: 0.4 %
INR BLD: 7.1 (ref 0.8–1.2)
LYMPHOCYTES # BLD AUTO: 1.01 X10(3) UL (ref 1–4)
LYMPHOCYTES NFR BLD AUTO: 12 %
MAGNESIUM SERPL-MCNC: 1.7 MG/DL (ref 1.6–2.6)
MCH RBC QN AUTO: 28.6 PG (ref 26–34)
MCHC RBC AUTO-ENTMCNC: 32.5 G/DL (ref 31–37)
MCV RBC AUTO: 88 FL (ref 80–100)
MONOCYTES # BLD AUTO: 0.76 X10(3) UL (ref 0.1–1)
MONOCYTES NFR BLD AUTO: 9 %
NEUTROPHILS # BLD AUTO: 6.51 X10 (3) UL (ref 1.5–7.7)
NEUTROPHILS # BLD AUTO: 6.51 X10(3) UL (ref 1.5–7.7)
NEUTROPHILS NFR BLD AUTO: 77.1 %
OSMOLALITY SERPL CALC.SUM OF ELEC: 304 MOSM/KG (ref 275–295)
PLATELET # BLD AUTO: 411 10(3)UL (ref 150–450)
POTASSIUM SERPL-SCNC: 4.1 MMOL/L (ref 3.5–5.1)
PROT SERPL-MCNC: 7.2 G/DL (ref 5.7–8.2)
PROTHROMBIN TIME: 61.5 SECONDS (ref 11.6–14.8)
RBC # BLD AUTO: 3.99 X10(6)UL (ref 3.8–5.8)
SODIUM SERPL-SCNC: 144 MMOL/L (ref 136–145)
TROPONIN I SERPL HS-MCNC: 8 NG/L (ref ?–53)
WBC # BLD AUTO: 8.4 X10(3) UL (ref 4–11)

## 2025-06-24 PROCEDURE — 84484 ASSAY OF TROPONIN QUANT: CPT | Performed by: EMERGENCY MEDICINE

## 2025-06-24 PROCEDURE — 85025 COMPLETE CBC W/AUTO DIFF WBC: CPT | Performed by: EMERGENCY MEDICINE

## 2025-06-24 PROCEDURE — 85610 PROTHROMBIN TIME: CPT | Performed by: EMERGENCY MEDICINE

## 2025-06-24 PROCEDURE — 93010 ELECTROCARDIOGRAM REPORT: CPT

## 2025-06-24 PROCEDURE — 85730 THROMBOPLASTIN TIME PARTIAL: CPT | Performed by: EMERGENCY MEDICINE

## 2025-06-24 PROCEDURE — 96365 THER/PROPH/DIAG IV INF INIT: CPT

## 2025-06-24 PROCEDURE — 99285 EMERGENCY DEPT VISIT HI MDM: CPT

## 2025-06-24 PROCEDURE — 99291 CRITICAL CARE FIRST HOUR: CPT

## 2025-06-24 PROCEDURE — 93005 ELECTROCARDIOGRAM TRACING: CPT

## 2025-06-24 PROCEDURE — 96366 THER/PROPH/DIAG IV INF ADDON: CPT

## 2025-06-24 PROCEDURE — 83735 ASSAY OF MAGNESIUM: CPT | Performed by: EMERGENCY MEDICINE

## 2025-06-24 PROCEDURE — 80053 COMPREHEN METABOLIC PANEL: CPT | Performed by: EMERGENCY MEDICINE

## 2025-06-24 RX ORDER — MAGNESIUM OXIDE 400 MG/1
400 TABLET ORAL ONCE
Status: COMPLETED | OUTPATIENT
Start: 2025-06-25 | End: 2025-06-25

## 2025-06-24 RX ORDER — OXYCODONE HYDROCHLORIDE 5 MG/1
TABLET ORAL EVERY 4 HOURS PRN
Status: DISCONTINUED | OUTPATIENT
Start: 2025-06-24 | End: 2025-06-25

## 2025-06-24 RX ORDER — ONDANSETRON 2 MG/ML
4 INJECTION INTRAMUSCULAR; INTRAVENOUS EVERY 6 HOURS PRN
Status: DISCONTINUED | OUTPATIENT
Start: 2025-06-24 | End: 2025-06-25

## 2025-06-24 RX ORDER — METOCLOPRAMIDE HYDROCHLORIDE 5 MG/ML
5 INJECTION INTRAMUSCULAR; INTRAVENOUS EVERY 8 HOURS PRN
Status: DISCONTINUED | OUTPATIENT
Start: 2025-06-24 | End: 2025-06-25

## 2025-06-24 RX ORDER — HYDROCODONE BITARTRATE AND ACETAMINOPHEN 5; 325 MG/1; MG/1
1-2 TABLET ORAL EVERY 6 HOURS PRN
Qty: 15 TABLET | Refills: 0 | Status: SHIPPED | OUTPATIENT
Start: 2025-06-24 | End: 2025-06-24

## 2025-06-24 RX ORDER — FENOFIBRATE 134 MG/1
134 CAPSULE ORAL
Status: DISCONTINUED | OUTPATIENT
Start: 2025-06-25 | End: 2025-06-25

## 2025-06-24 RX ORDER — HYDROCODONE BITARTRATE AND ACETAMINOPHEN 5; 325 MG/1; MG/1
1 TABLET ORAL ONCE
Refills: 0 | Status: COMPLETED | OUTPATIENT
Start: 2025-06-24 | End: 2025-06-24

## 2025-06-24 RX ORDER — ACETAMINOPHEN 500 MG
500 TABLET ORAL EVERY 4 HOURS PRN
Status: DISCONTINUED | OUTPATIENT
Start: 2025-06-24 | End: 2025-06-25

## 2025-06-24 RX ORDER — METOPROLOL TARTRATE 100 MG/1
100 TABLET ORAL
Status: DISCONTINUED | OUTPATIENT
Start: 2025-06-25 | End: 2025-06-25

## 2025-06-24 RX ORDER — NALTREXONE HYDROCHLORIDE 50 MG/1
50 TABLET, FILM COATED ORAL DAILY
Status: DISCONTINUED | OUTPATIENT
Start: 2025-06-24 | End: 2025-06-25

## 2025-06-24 RX ORDER — SENNA AND DOCUSATE SODIUM 50; 8.6 MG/1; MG/1
1 TABLET, FILM COATED ORAL DAILY
Status: DISCONTINUED | OUTPATIENT
Start: 2025-06-24 | End: 2025-06-25

## 2025-06-24 RX ORDER — METOPROLOL TARTRATE 50 MG
50 TABLET ORAL EVERY EVENING
Status: DISCONTINUED | OUTPATIENT
Start: 2025-06-24 | End: 2025-06-25

## 2025-06-24 RX ORDER — ATORVASTATIN CALCIUM 10 MG/1
10 TABLET, FILM COATED ORAL NIGHTLY
Status: DISCONTINUED | OUTPATIENT
Start: 2025-06-24 | End: 2025-06-25

## 2025-06-24 RX ORDER — PREGABALIN 75 MG/1
75 CAPSULE ORAL DAILY
Status: DISCONTINUED | OUTPATIENT
Start: 2025-06-24 | End: 2025-06-25

## 2025-06-25 VITALS
HEART RATE: 95 BPM | DIASTOLIC BLOOD PRESSURE: 93 MMHG | HEIGHT: 68 IN | WEIGHT: 245.56 LBS | RESPIRATION RATE: 18 BRPM | SYSTOLIC BLOOD PRESSURE: 157 MMHG | BODY MASS INDEX: 37.21 KG/M2 | OXYGEN SATURATION: 94 % | TEMPERATURE: 99 F

## 2025-06-25 LAB
ANION GAP SERPL CALC-SCNC: 10 MMOL/L (ref 0–18)
BASOPHILS # BLD AUTO: 0.03 X10(3) UL (ref 0–0.2)
BASOPHILS NFR BLD AUTO: 0.5 %
BUN BLD-MCNC: 21 MG/DL (ref 9–23)
CALCIUM BLD-MCNC: 10.4 MG/DL (ref 8.7–10.6)
CHLORIDE SERPL-SCNC: 109 MMOL/L (ref 98–112)
CO2 SERPL-SCNC: 25 MMOL/L (ref 21–32)
CREAT BLD-MCNC: 1.07 MG/DL (ref 0.7–1.3)
EGFRCR SERPLBLD CKD-EPI 2021: 75 ML/MIN/1.73M2 (ref 60–?)
EOSINOPHIL # BLD AUTO: 0.11 X10(3) UL (ref 0–0.7)
EOSINOPHIL NFR BLD AUTO: 1.7 %
ERYTHROCYTE [DISTWIDTH] IN BLOOD BY AUTOMATED COUNT: 13.5 %
GLUCOSE BLD-MCNC: 99 MG/DL (ref 70–99)
HCT VFR BLD AUTO: 32.7 % (ref 39–53)
HGB BLD-MCNC: 10.7 G/DL (ref 13–17.5)
IMM GRANULOCYTES # BLD AUTO: 0.02 X10(3) UL (ref 0–1)
IMM GRANULOCYTES NFR BLD: 0.3 %
INR BLD: 3.11 (ref 0.8–1.2)
LYMPHOCYTES # BLD AUTO: 1.01 X10(3) UL (ref 1–4)
LYMPHOCYTES NFR BLD AUTO: 15.8 %
MAGNESIUM SERPL-MCNC: 1.8 MG/DL (ref 1.6–2.6)
MCH RBC QN AUTO: 28.5 PG (ref 26–34)
MCHC RBC AUTO-ENTMCNC: 32.7 G/DL (ref 31–37)
MCV RBC AUTO: 87.2 FL (ref 80–100)
MONOCYTES # BLD AUTO: 0.77 X10(3) UL (ref 0.1–1)
MONOCYTES NFR BLD AUTO: 12.1 %
NEUTROPHILS # BLD AUTO: 4.45 X10 (3) UL (ref 1.5–7.7)
NEUTROPHILS # BLD AUTO: 4.45 X10(3) UL (ref 1.5–7.7)
NEUTROPHILS NFR BLD AUTO: 69.6 %
OSMOLALITY SERPL CALC.SUM OF ELEC: 301 MOSM/KG (ref 275–295)
PLATELET # BLD AUTO: 334 10(3)UL (ref 150–450)
POTASSIUM SERPL-SCNC: 3.8 MMOL/L (ref 3.5–5.1)
PROTHROMBIN TIME: 32.2 SECONDS (ref 11.6–14.8)
Q-T INTERVAL: 292 MS
QRS DURATION: 88 MS
QTC CALCULATION (BEZET): 470 MS
R AXIS: 51 DEGREES
RBC # BLD AUTO: 3.75 X10(6)UL (ref 3.8–5.8)
SODIUM SERPL-SCNC: 144 MMOL/L (ref 136–145)
T AXIS: 238 DEGREES
VENTRICULAR RATE: 156 BPM
WBC # BLD AUTO: 6.4 X10(3) UL (ref 4–11)

## 2025-06-25 PROCEDURE — 80048 BASIC METABOLIC PNL TOTAL CA: CPT | Performed by: HOSPITALIST

## 2025-06-25 PROCEDURE — 96366 THER/PROPH/DIAG IV INF ADDON: CPT

## 2025-06-25 PROCEDURE — 85025 COMPLETE CBC W/AUTO DIFF WBC: CPT | Performed by: HOSPITALIST

## 2025-06-25 PROCEDURE — 85610 PROTHROMBIN TIME: CPT | Performed by: STUDENT IN AN ORGANIZED HEALTH CARE EDUCATION/TRAINING PROGRAM

## 2025-06-25 PROCEDURE — 83735 ASSAY OF MAGNESIUM: CPT | Performed by: HOSPITALIST

## 2025-06-25 RX ORDER — POTASSIUM CHLORIDE 1500 MG/1
40 TABLET, EXTENDED RELEASE ORAL ONCE
Status: COMPLETED | OUTPATIENT
Start: 2025-06-25 | End: 2025-06-25

## 2025-06-25 RX ORDER — VERAPAMIL HYDROCHLORIDE 240 MG/1
240 TABLET, FILM COATED, EXTENDED RELEASE ORAL ONCE
Status: COMPLETED | OUTPATIENT
Start: 2025-06-25 | End: 2025-06-25

## 2025-06-25 RX ORDER — MAGNESIUM OXIDE 400 MG/1
400 TABLET ORAL ONCE
Status: COMPLETED | OUTPATIENT
Start: 2025-06-25 | End: 2025-06-25

## 2025-06-25 NOTE — CONSULTS
Cardiology Consultation Note      Chuck Goodrich Patient Status:  Observation    1955 MRN [id number]   Location [facility] 2NE-A Attending Shashank Clemens, DO   Hosp Day # 0 PCP Osiel Bautista MD     Reason for consultation:  Elevated IUNR  AFIb RVR    Impression:  INR> 12 prior to admission.  s/p vitmain K 2.5 mg   Permanent AFIB: in RVR on admission   [id number] Score is 2 [HTN-1, Age 65-74y-1]   SSS s/p PPM   Non obstructive CAD: CAC score 19, nuclear stress negative in   Mild aortic stenosis   HTN  Anemia. Down to 10.7. likely from recent surgery     Plan:  INR down to 3.1 now. Will hold coumadin for today and have him follow up with coumadin clinic in the next 48 hours   RVR on admission. Rates well controlled now. Unclear cause. Wean off diltiazem drip and restart home metoprolol and verapamil  Echo in march with normal EF and mild aortic stenosis   OK for DC today as long as rates are well controlled  Repeat CBC in 1 week   Follow up with us in 1 week       History of Present Illness:  Chuck Goodrich is a 69 year old male who presented to [facility] on 2025.    This is a patient of mine     The patient has a complicated history as above who presents with elevated INR of > 12 and AFIbwith RVR    I received a call last night from lab that INR was >12 and instructed him to go to ED    In the ED he was found to have INR >7. RVR with rates in the 160's. Denies any palps or chest pains     Heart rate now in the 80's     Cardiology consultation was requested.    Medications:  Current Hospital Medications[1]    Past Medical History[2]    Past Surgical History[3]    Family History  There is no family history of sudden cardiac death.    Social History   reports that he has quit smoking. His smoking use included cigars. He has never been exposed to tobacco smoke. He has never used smokeless tobacco. He reports that he does not currently use alcohol after a past usage of  about 1.0 standard drink of alcohol per week. He reports that he does not use drugs.     Allergies  Allergies[4]      Review of Systems:  Constitutional: negative for fevers  Eyes: negative for visual disturbance  Ears, nose, mouth, throat, and face: negative for epistaxis  Respiratory: negative for dyspnea on exertion  Cardiovascular: negative for chest pain  Gastrointestinal: negative for melena  Genitourinary:negative for hematuria  Hematologic/lymphatic: negative for bleeding  Musculoskeletal:negative for myalgias  Neurological: negative for dizziness and headaches  Endocrine: negative for temperature intolerance      Physical Exam:  Blood pressure (!) 177/88, pulse 83, temperature 98.4 °F (36.9 °C), temperature source Oral, resp. rate 20, height 5' 8\" (1.727 m), weight 245 lb 9.5 oz (111.4 kg), SpO2 94%.  Temp (24hrs), Av.3 °F (36.8 °C), Min:98.3 °F (36.8 °C), Max:98.4 °F (36.9 °C)    Wt Readings from Last 3 Encounters:   25 245 lb 9.5 oz (111.4 kg)   25 251 lb 5.2 oz (114 kg)   02/15/24 284 lb (128.8 kg)       General: Awake and alert; in no acute distress  HEENT: Extraocular movements are intact; sclerae are anicteric; scalp is atrauamatic; no thyromegaly  Neck: Supple; no JVD; no carotid bruits  Cardiac: Regular rate and regular rhythm; no murmurs/rubs/gallops are appreciated; PMI is non-displaced; there is no evidence of a sternal heave  Lungs: Clear to auscultation bilaterally; no accessory muscle use is noted  Abdomen: Soft, non-tender; bowel sounds are normoactive; no hepatosplenomegaly  Extremities: No clubbing or cyanosis; moves all 4 extremities normally  Psychiatric: Normal mood and affect; answers questions appropriately  Dermatologic: No rashes; normal skin turgor    Diagnostic testing:    EKG: AFIB    Labs:   Lab Results   Component Value Date    INR 7.10 (HH) 2025    INR 1.26 (H) 2025        Lab Results   Component Value Date    WBC 6.4 2025    HGB 10.7  06/25/2025    HCT 32.7 06/25/2025    .0 06/25/2025    CREATSERUM 1.07 06/25/2025    BUN 21 06/25/2025     06/25/2025    K 3.8 06/25/2025     06/25/2025    CO2 25.0 06/25/2025    GLU 99 06/25/2025    CA 10.4 06/25/2025    ALB 4.5 06/24/2025    ALKPHO 82 06/24/2025    BILT 0.4 06/24/2025    TP 7.2 06/24/2025    AST 25 06/24/2025    ALT 12 06/24/2025    .5 06/24/2025    INR 7.10 06/24/2025    PTP 61.5 06/24/2025    MG 1.8 06/25/2025         Thank you for allowing our practice to participate in the care of your patient. Please do not hesitate to contact me if you have any questions.    Reina Dior MD           [1]   Current Facility-Administered Medications   Medication Dose Route Frequency    magnesium oxide (Mag-Ox) tab 400 mg  400 mg Oral Once    potassium chloride (Klor-Con M20) tab 40 mEq  40 mEq Oral Once    verapamil ER (Calan-SR) tab 240 mg  240 mg Oral Once    dilTIAZem (cardIZEM) 100 mg in sodium chloride 0.9% 100 mL IVPB-ADDV  2.5-20 mg/hr Intravenous Continuous    fenofibrate micronized (Lofibra) cap 134 mg  134 mg Oral Daily with breakfast    metoprolol tartrate (Lopressor) tab 100 mg  100 mg Oral Daily Beta Blocker    metoprolol tartrate (Lopressor) tab 50 mg  50 mg Oral QPM    naltrexone (ReVia) tab 50 mg  50 mg Oral Daily    oxyCODONE immediate release tab 2.5-5 mg  2.5-5 mg Oral Q4H PRN    atorvastatin (Lipitor) tab 10 mg  10 mg Oral Nightly    pregabalin (Lyrica) cap 75 mg  75 mg Oral Daily    senna-docusate (Senokot-S) 8.6-50 MG per tab 1 tablet  1 tablet Oral Daily    acetaminophen (Tylenol Extra Strength) tab 500 mg  500 mg Oral Q4H PRN    melatonin tab 3 mg  3 mg Oral Nightly PRN    ondansetron (Zofran) 4 MG/2ML injection 4 mg  4 mg Intravenous Q6H PRN    metoclopramide (Reglan) 5 mg/mL injection 5 mg  5 mg Intravenous Q8H PRN   [2]   Past Medical History:   Arrhythmia    Atrial fibrillation (HCC)    CHF (congestive heart failure) (HCC)    Congestive heart disease  (HCC)    High blood pressure    High cholesterol    Hypertension    Long term (current) use of anticoagulants    Mild mitral regurgitation    ORI (obstructive sleep apnea)    Osteoarthritis    Sleep apnea    history    Visual impairment   [3]   Past Surgical History:  Procedure Laterality Date    Cardiac defibrillator placement      Cardiac pacemaker placement      itBit. EP: Dr Dior    Colonoscopy N/A 01/19/2017    Procedure: COLONOSCOPY;  Surgeon: Fidel Cuenca MD;  Location:  ENDOSCOPY    Colonoscopy      Eye surgery      Other surgical history      Pacemaker   [4] No Known Allergies

## 2025-06-25 NOTE — PLAN OF CARE
Pt transferred from ER to room 2623 at 2310~. Aox4, pt wears glasses. RA, lung fields clear. A-fib on tele (100-110), intermittent pacing. Pt reports surgical pain r/t L-hip surgery. SBA w/Cane. Continent of bowel and bladder. NP edema to BLE/BUE (hands). L-hip incision CDI, ROSA M.  Bed locked and in lowest position. Call light and personal items within reach.      POC:   -Cardiology to see   -Cardizem GTT     Problem: CARDIOVASCULAR - ADULT  Goal: Maintains optimal cardiac output and hemodynamic stability  Description: INTERVENTIONS:  - Monitor vital signs, rhythm, and trends  - Monitor for bleeding, hypotension and signs of decreased cardiac output  - Evaluate effectiveness of vasoactive medications to optimize hemodynamic stability  - Monitor arterial and/or venous puncture sites for bleeding and/or hematoma  - Assess quality of pulses, skin color and temperature  - Assess for signs of decreased coronary artery perfusion - ex. Angina  - Evaluate fluid balance, assess for edema, trend weights  Outcome: Progressing  Goal: Absence of cardiac arrhythmias or at baseline  Description: INTERVENTIONS:  - Continuous cardiac monitoring, monitor vital signs, obtain 12 lead EKG if indicated  - Evaluate effectiveness of antiarrhythmic and heart rate control medications as ordered  - Initiate emergency measures for life threatening arrhythmias  - Monitor electrolytes and administer replacement therapy as ordered  Outcome: Progressing     Problem: PAIN - ADULT  Goal: Verbalizes/displays adequate comfort level or patient's stated pain goal  Description: INTERVENTIONS:  - Encourage pt to monitor pain and request assistance  - Assess pain using appropriate pain scale  - Administer analgesics based on type and severity of pain and evaluate response  - Implement non-pharmacological measures as appropriate and evaluate response  - Consider cultural and social influences on pain and pain management  - Manage/alleviate anxiety  - Utilize  distraction and/or relaxation techniques  - Monitor for opioid side effects  - Notify MD/LIP if interventions unsuccessful or patient reports new pain  - Anticipate increased pain with activity and pre-medicate as appropriate  Outcome: Progressing

## 2025-06-25 NOTE — PROGRESS NOTES
06/24/25 2318 06/24/25 2320 06/24/25 2322   Vital Signs   Pulse 118 108 (!) 123   Heart Rate Source Monitor Monitor Monitor   Resp 20  --   --    Respiratory Quality Normal Normal Normal   BP (!) 168/112 (!) 170/97 (!) 143/102   MAP (mmHg) (!) 127 (!) 119 (!) 115   BP Location Right arm Right arm Right arm   BP Method Automatic Automatic Automatic   Patient Position Lying Sitting Standing     Pt denies feeling dizzy or lightheaded upon changing position

## 2025-06-25 NOTE — DISCHARGE INSTRUCTIONS
-Hold Coumadin  -Follow up in coumadin clinic in next 48 hours.   -Follow up with cardiology in 1 week

## 2025-06-25 NOTE — ED INITIAL ASSESSMENT (HPI)
Pt states he had outpt labs today, advised to come by Cardiologist for an INR-12.8. Pt on Coumadin. Hx Afib. HR irreg, 116-140's in Triage. Pt denies CP, he denies JEN. He denies bleeding elsewhere. EKG done immediately

## 2025-06-25 NOTE — PLAN OF CARE
Assumed care of patient at 0730.  Alert and oriented x4.  On room air with adequate O2 saturations.   Afib on tele, rates are controlled off cardizem gtt.  Continent of bowel and bladder.  Up SBA with cane.   Pt and wife updated on plan of care, verbalized understanding.     Problem: CARDIOVASCULAR - ADULT  Goal: Maintains optimal cardiac output and hemodynamic stability  Description: INTERVENTIONS:  - Monitor vital signs, rhythm, and trends  - Monitor for bleeding, hypotension and signs of decreased cardiac output  - Evaluate effectiveness of vasoactive medications to optimize hemodynamic stability  - Monitor arterial and/or venous puncture sites for bleeding and/or hematoma  - Assess quality of pulses, skin color and temperature  - Assess for signs of decreased coronary artery perfusion - ex. Angina  - Evaluate fluid balance, assess for edema, trend weights  Outcome: Progressing  Goal: Absence of cardiac arrhythmias or at baseline  Description: INTERVENTIONS:  - Continuous cardiac monitoring, monitor vital signs, obtain 12 lead EKG if indicated  - Evaluate effectiveness of antiarrhythmic and heart rate control medications as ordered  - Initiate emergency measures for life threatening arrhythmias  - Monitor electrolytes and administer replacement therapy as ordered  Outcome: Progressing     Problem: PAIN - ADULT  Goal: Verbalizes/displays adequate comfort level or patient's stated pain goal  Description: INTERVENTIONS:  - Encourage pt to monitor pain and request assistance  - Assess pain using appropriate pain scale  - Administer analgesics based on type and severity of pain and evaluate response  - Implement non-pharmacological measures as appropriate and evaluate response  - Consider cultural and social influences on pain and pain management  - Manage/alleviate anxiety  - Utilize distraction and/or relaxation techniques  - Monitor for opioid side effects  - Notify MD/LIP if interventions unsuccessful or patient  reports new pain  - Anticipate increased pain with activity and pre-medicate as appropriate  Outcome: Progressing

## 2025-06-25 NOTE — ED QUICK NOTES
Orders for admission, patient is aware of plan and ready to go upstairs. Any questions, please call ED RN John at extension [phone].     Patient Covid vaccination status: Fully vaccinated     COVID Test Ordered in ED: None    COVID Suspicion at Admission: N/A    Running Infusions: Medication Infusions[1]     Mental Status/LOC at time of transport: x4    Other pertinent information:   CIWA score: N/A   NIH score:  N/A             [1]    dilTIAZem 15 mg/hr (06/24/25 9014)

## 2025-06-25 NOTE — ED PROVIDER NOTES
Patient Seen in: [facility] Emergency Department        History  Chief Complaint   Patient presents with    Abnormal Result     Stated Complaint: ELEVATED INR    Subjective:   HPI            69-year-old man presents with abnormal outpatient labs.  Had blood work done showing an INR of 12+.  No reported easy bleeding or bruising.  No hematuria or melena or hematochezia.  No complaints at this time.  In triage noted to be markedly tachycardic and EKG showing atrial fibrillation with RVR.  No palpitations, no shortness of breath, no chest pain      Objective:     No pertinent past medical history.            No pertinent past surgical history.              No pertinent social history.                              Physical Exam    ED Triage Vitals [06/24/25 2016]   BP 98/79   Pulse (!) 141   Resp 22   Temp 98.3 °F (36.8 °C)   Temp src Oral   SpO2 98 %   O2 Device None (Room air)       Current Vitals:   Vital Signs  BP: 145/88  Pulse: 116  Resp: 16  Temp: 98.3 °F (36.8 °C)  Temp src: Oral  MAP (mmHg): 98    Oxygen Therapy  SpO2: 100 %  O2 Device: None (Room air)            Physical Exam  Constitutional:       General: Is not in acute distress.     Appearance: Is not ill-appearing.   HENT:      Head: Normocephalic and atraumatic.      Mouth/Throat:      Mouth: Mucous membranes are moist.   Eyes:      Conjunctiva/sclera: Conjunctivae normal.      Pupils: Pupils are equal, round, and reactive to light.   Cardiovascular:      Rate and Rhythm: Tachycardic, irregular rate   pulmonary:      Effort: Pulmonary effort is normal. No respiratory distress.      Breath sounds: Normal breath sounds.   Abdominal:      General: Abdomen is flat. There is no distension.      Tenderness: There is no abdominal tenderness.   Musculoskeletal:      Right lower leg: No edema.      Left lower leg: No edema.   Skin:     General: Skin is warm and dry.   Neurological:      General: No focal deficit present.      Mental Status: Is alert.            ED Course  Labs Reviewed   PROTHROMBIN TIME (PT) - Abnormal; Notable for the following components:       Result Value    PT 61.5 (*)     INR 7.10 (*)     All other components within normal limits   PTT, ACTIVATED - Abnormal; Notable for the following components:    .5 (*)     All other components within normal limits   CBC WITH DIFFERENTIAL WITH PLATELET - Abnormal; Notable for the following components:    HGB 11.4 (*)     HCT 35.1 (*)     All other components within normal limits   COMP METABOLIC PANEL (14) - Abnormal; Notable for the following components:    Glucose 127 (*)     BUN 24 (*)     Calcium, Total 10.8 (*)     Calculated Osmolality 304 (*)     All other components within normal limits   MAGNESIUM - Normal   TROPONIN I HIGH SENSITIVITY - Normal   RAINBOW DRAW LAVENDER   RAINBOW DRAW LIGHT GREEN     EKG    Rate, intervals and axes as noted on EKG Report.  Rate: 156  Rhythm: Atrial fibrillation with RVR  Reading: Precordial ST depressions, no ST elevation              Upon my evaluation, this patient had a high probability of imminent or life-threatening deterioration due to critical findings of ECG and hypotension, which required my direct attention, intervention, and personal management.    I have personally provided 35 minutes of critical care time, exclusive of time spent on separately billable procedures.  Time includes review of all pertinent laboratory/radiology results, discussion with consultants, and monitoring for potential decompensation.  Performed interventions included anti-arrhythmics.                    MDM     Considered all emergent etiologies, differential includes but is not limited to: Supratherapeutic INR, A-fib with RVR, ACS     I have independently visualized the radiology images, my focus/limited interpretation: N/A     Defer to radiologist for other/incidental findings    Initial EKG with atrial fibrillation with RVR with ST depressions in precordial leads likely  secondary to rate.  Initially with hypotension.  Started on diltiazem drip and bolus given.  Improvement in the rate as well as the blood pressure after initiation of diltiazem.  Repeat INR 7.1.  Given 2.5 mg vitamin K.  Discussed with Dr. Jett for admission, d/w Dr. Dior for cardiology consultation.     Admission disposition: 6/24/2025  9:34 PM           Medical Decision Making      Disposition and Plan     Clinical Impression:  1. Atrial fibrillation with RVR (HCC)    2. Supratherapeutic INR         Disposition:  Admit  6/24/2025  9:34 pm    Follow-up:  Osiel Bautista MD  [address] [facility]  [address] [place] [id number] [phone]    Follow up            Medications Prescribed:  Current Discharge Medication List                Supplementary Documentation:         Hospital Problems       Present on Admission  Date Reviewed: 3/1/2022          ICD-10-CM Noted POA    * (Principal) Atrial fibrillation with RVR (HCC) I48.91 6/24/2025 Unknown

## 2025-06-25 NOTE — DISCHARGE SUMMARY
[facility] Internal Medicine Hospitalist Discharge Summary     Patient ID:  Chuck Goodrich  69 year old  8/27/1955    Admission Date/Time  6/24/2025  8:18 PM  Discharge Date  06/25/25    PCP  Osiel Bautista MD     Discharging Hospitalist:  Shashank Clemens DO    Disposition:  Home    Follow Up Appointments  Osiel Bautista MD  [address]  [address]  [facility] [address] [phone]    Follow up      Reina Dior MD  [address]   [address]  [facility] [address]  [phone]    Go on 7/1/2025  Office visit at 12:20      Primary Hospital Problems/Hospital Course Summary  69 year old male with PMH sig for Afib on Coumadin, SSS s/p PPM, HTN who presents for an elevated INR.     #Afib RVR  #Supratherapeutic INR  -s/p cardizem gtt. Continue metoprolol.   -Coumadin held. S/p vitamin K in ED. Repeat INR 3.11  -Maintain Mg > 2, K > 4  -Cardiology consulted  -Follow up in coumadin clinic in 48 hours  -Follow up cardiology clinic and PCP    Medication Changes  NA    Important Follow Up Items  Labs: INR (3.11 on DC), CBC 1 week  Incidental Findings: NA    Procedures/Diagnostics  NA    Operative Procedures:   NA    Change in Code Status: Full      Hospital Course/Secondary Diagnoses:      #SSS s/p PPM  #HTN  #HLD  #ORI  -Resume home meds  -CPAP qhs    Consults: IP CONSULT TO CARDIOLOGY    Discharge Medications       Medication List        PAUSE taking these medications      * warfarin 2 MG Tabs  Wait to take this until your doctor or other care provider tells you to start again.  Commonly known as: Coumadin  Take as directed. If you are unsure how to take this medication, talk to your nurse or doctor.     * warfarin 2 MG Tabs  Wait to take this until your doctor or other care provider tells you to start again.  Commonly known as: Coumadin  Take as directed. If you are unsure how to take this medication, talk to your nurse or doctor.            * This list has 2 medication(s) that are the same as other medications prescribed for you. Read the directions carefully, and ask your doctor or other care provider to review them with you.                CONTINUE taking these medications      acetaminophen 500 MG Tabs  Commonly known as: Tylenol Extra Strength     fenofibrate 145 MG Tabs  Commonly known as: Tricor     metFORMIN 850 MG Tabs  Commonly known as: Glucophage     * metoprolol tartrate 100 MG Tabs  Commonly known as: Lopressor     * metoprolol tartrate 100 MG Tabs  Commonly known as: Lopressor     naltrexone 50 MG Tabs  Commonly known as: ReVia     oxyCODONE 5 MG Tabs     pravastatin 40 MG Tabs  Commonly known as: Pravachol     sennosides-docusate 8.6-50 MG Tabs  Commonly known as: Pericolace     traMADol 50 MG Tabs  Commonly known as: Ultram     verapamil  MG Tbcr  Commonly known as: Calan-SR  Take 1 tablet (240 mg total) by mouth 2 (two) times daily.           * This list has 2 medication(s) that are the same as other medications prescribed for you. Read the directions carefully, and ask your doctor or other care provider to review them with you.                I reconciled current and discharge medications on the day of discharge.    Imaging/Diagnostic Reports  No results found.      Patient instructions:      I as the attending physician reconciled the current and discharge medications on day of discharge.     Current Discharge Medication List        CONTINUE these medications which have NOT CHANGED    Details   oxyCODONE 5 MG Oral Tab Take 0.5-1 tablets (2.5-5 mg total) by mouth every 4 (four) hours as needed for Pain. Post op      traMADol 50 MG Oral Tab Take 1 tablet (50 mg total) by mouth every 4 to 6 hours as needed for Pain.      acetaminophen 500 MG Oral Tab Take 2 tablets (1,000 mg total) by mouth every 4 (four) hours as needed for Pain. Post op      sennosides-docusate 8.6-50 MG Oral Tab Take 1 tablet by mouth daily. Post op       !! metoprolol tartrate 100 MG Oral Tab Take 1 tablet (100 mg total) by mouth every morning. Take 100mg (1 tablet) by mouth every morning, and 50mg (1/2 tablet) by mouth every evening.      !! metoprolol tartrate 100 MG Oral Tab Take 0.5 tablets (50 mg total) by mouth every evening. Take 50mg (1/2 tablet) by mouth every evening, and 100mg (1 tablet) by mouth every morning.      !! warfarin 2 MG Oral Tab Take 1 tablet (2 mg total) by mouth As Directed. Takes 2 mg (1 tablet) by mouth once daily on Sunday, Monday, Tuesday, Thursday and Friday evenings. Take 4mg (2 tablets) by mouth once daily on Wednesday and Saturday evenings.      metFORMIN 850 MG Oral Tab Take 1 tablet (850 mg total) by mouth in the morning, at noon, and at bedtime.      fenofibrate 145 MG Oral Tab Take 1 tablet (145 mg total) by mouth daily.      pravastatin 40 MG Oral Tab Take 1 tablet (40 mg total) by mouth nightly.      naltrexone 50 MG Oral Tab Take 1 tablet (50 mg total) by mouth daily.      verapamil  MG Oral Tab CR Take 1 tablet (240 mg total) by mouth 2 (two) times daily.      !! warfarin 2 MG Oral Tab Take 2 tablets (4 mg total) by mouth twice a week. Take 4mg (2 tablets) by mouth twice weekly on Wednesday and Saturday evenings. Take 2mg (1 tablet) by mouth once daily on Sunday, Monday, Tuesday, Thursday, and Friday evenings.       !! - Potential duplicate medications found. Please discuss with provider.                Exam on day of discharge:     Vitals:    06/25/25 1154   BP: (!) 157/93   Pulse: 95   Resp: 18   Temp: 98.5 °F (36.9 °C)       General: Alert, awake, cooperative.  HEENT:  Normocephalic, atraumatic.  Neck:  Trachea midline.  Neck supple.   Chest:  Clear to auscultation bilaterally. No wheezes, rales, or rhonchi.  CV:   Irregular rhythm.  Positive S1/S2.   GI: Bowel sounds present in all four quadrants, abdomen is soft, non-tender, non-distended.  Extremities:  No lower extremity edema or cyanosis.  Neurological:   AAOx4. No focal deficits.  Skin:  Warm and dry.      Total time coordinating care for discharge: 32 minutes    Shashank Clemens DO

## 2025-06-25 NOTE — H&P
[facility] History and Physical      Chief Complaint   Patient presents with    Abnormal Result        PCP: Osiel Bautista MD      History of Present Illness: Patient is a 69 year old male with PMH sig for Afib on Coumadin, SSS s/p PPM, HTN who presents for an elevated INR. Patient states he was noted to have an INR >12 on his outpatient labs, was recommended by his cardiologist to come to the hospital for further management.  INR 7.1 on presentation.  P in A-fib RVR.  Patient given 2.5 mg vitamin K.  Patient also noted to be in Afib RVR. Given a dose of verapamil and started on Cardizem gtt. cardiology consulted.    Past Medical History[1]   Past Surgical History[2]     ALL:  Allergies[3]     Prior to Admission Medications[4]    Social History     Tobacco Use    Smoking status: Former     Types: Cigars     Passive exposure: Never    Smokeless tobacco: Never    Tobacco comments:     1-2 cigars in a year   Substance Use Topics    Alcohol use: Not Currently     Alcohol/week: 1.0 standard drink of alcohol     Types: 1 Standard drinks or equivalent per week     Comment: occas        Fam Hx  Family History[5]    Review of Systems  Comprehensive ROS reviewed and negative except for what is stated in HPI.      OBJECTIVE:  BP (!) 177/88 (BP Location: Right arm)   Pulse 83   Temp 98.4 °F (36.9 °C) (Oral)   Resp 20   Ht 5' 8\" (1.727 m)   Wt 245 lb 9.5 oz (111.4 kg)   SpO2 94%   BMI 37.34 kg/m²   Physical Exam:  General: Alert, awake, cooperative.  HEENT:  Normocephalic, atraumatic.  Neck:  Trachea midline.  Neck supple.   Chest:  Clear to auscultation bilaterally. No wheezes, rales, or rhonchi.  CV:   Irregular rhythm.  Positive S1/S2.   GI: Bowel sounds present in all four quadrants, abdomen is soft, non-tender, non-distended.  Extremities:  No lower extremity edema or cyanosis.  Neurological:  AAOx4. No focal deficits.  Skin:  Warm and dry.        Data Review:    LABS:   Lab Results    Component Value Date    WBC 6.4 06/25/2025    HGB 10.7 06/25/2025    HCT 32.7 06/25/2025    .0 06/25/2025    CREATSERUM 1.07 06/25/2025    BUN 21 06/25/2025     06/25/2025    K 3.8 06/25/2025     06/25/2025    CO2 25.0 06/25/2025    GLU 99 06/25/2025    CA 10.4 06/25/2025    ALB 4.5 06/24/2025    ALKPHO 82 06/24/2025    BILT 0.4 06/24/2025    TP 7.2 06/24/2025    AST 25 06/24/2025    ALT 12 06/24/2025    .5 06/24/2025    INR 7.10 06/24/2025    PTP 61.5 06/24/2025    MG 1.8 06/25/2025         Radiology: No results found.     Assessment/Plan:   69 year old male with PMH sig for Afib on Coumadin, SSS s/p PPM, HTN who presents for an elevated INR.    #Afib RVR  #Supratherapeutic INR  -Wean cardizem gtt. Continue metoprolol.   -Coumadin held. S/p vitamin K in ED. Repeat PT/INR.   -Maintain Mg > 2, K > 4  -Cardiology consulted    #SSS s/p PPM  #HTN  #HLD  #ORI  -Resume home meds  -CPAP qhs    DVT ppx SCDs, Coumadin held  Full Code     D/W RN    Outpatient records or previous hospital records reviewed.   [facility] hospitalist to continue to follow patient while in house  A total of 77 minutes taken with patient and coordinating care.  Greater than 50% face to face encounter.      Advanced Care Planning    While discussing goals of care with the patient and their family, Chuck voluntarily participated in an advanced care planning discussion. The following was discussed: CODE STATUS.  He wishes to remain full code.    16 minutes were spent in discussing advanced care planning. This time was exclusive of the documented time for this visit.      Shashank Clemens DO  [facility] Hospitalist               [1]   Past Medical History:   Arrhythmia    Atrial fibrillation (HCC)    CHF (congestive heart failure) (HCC)    Congestive heart disease (HCC)    High blood pressure    High cholesterol    Hypertension    Long term (current) use of anticoagulants    Mild mitral regurgitation    ORI (obstructive  sleep apnea)    Osteoarthritis    Sleep apnea    history    Visual impairment   [2]   Past Surgical History:  Procedure Laterality Date    Cardiac defibrillator placement      Cardiac pacemaker placement      [employer]. EP: Dr Dior    Colonoscopy N/A 01/19/2017    Procedure: COLONOSCOPY;  Surgeon: Fidel Cuenca MD;  Location:  ENDOSCOPY    Colonoscopy      Eye surgery      Other surgical history      Pacemaker   [3] No Known Allergies  [4]   No current outpatient medications on file.   [5]   Family History  Problem Relation Age of Onset    Cancer Father         Esophageal    Other (Other) Mother         Heart Failer.

## 2025-06-25 NOTE — PROGRESS NOTES
NURSING DISCHARGE NOTE    Discharged Home via Wheelchair.  Accompanied by PCT  Belongings Taken by patient/family.    Discharge instructions given to patient who verbalized all understanding. IV and tele removed. Pt aware he needs to go to coumadin clinic in the next 48 hours to get drawn, he states he already has an appt.

## (undated) DEVICE — SYRINGE MED 30ML STD CLR PLAS LL TIP N CTRL

## (undated) DEVICE — GLOVE SUR 6.5 SENSICARE PI PIP GRN PWD F

## (undated) DEVICE — SUT FBRWR 2 38IN N ABSRB BLU L26.5MM 1/2

## (undated) DEVICE — DRAPE,TOP,102X53,STERILE: Brand: MEDLINE

## (undated) DEVICE — NEPTUNE E-SEP SMOKE EVACUATION PENCIL, COATED, 70MM BLADE, PUSH BUTTON SWITCH: Brand: NEPTUNE E-SEP

## (undated) DEVICE — SOLUTION IRRIG 3000ML 0.9% NACL FLX CONT

## (undated) DEVICE — GLOVE SUR 7 SENSICARE PI PIP CRM PWD F

## (undated) DEVICE — SUT MCRYL 3-0 27IN ABSRB UD 19MM PS-2 3/8

## (undated) DEVICE — COVER,LIGHT,CAMERA,HARD,1/PK,STRL: Brand: MEDLINE

## (undated) DEVICE — Device

## (undated) DEVICE — HOOD, PEEL-AWAY: Brand: FLYTE

## (undated) DEVICE — WRAP HIP COMPR

## (undated) DEVICE — SUT COAT VCRL 0 27IN CP-1 ABSRB UD 36MM 1/2

## (undated) DEVICE — 3M™ STERI-DRAPE™ INSTRUMENT POUCH 1018: Brand: STERI-DRAPE™

## (undated) DEVICE — SUT STRATAFIX SPRL MCRYL+ 2-0 27IN CT-1 ABSRB

## (undated) DEVICE — 1016 S-DRAPE IRRIG POUCH 10/BOX: Brand: STERI-DRAPE™

## (undated) DEVICE — 3M™ IOBAN™ 2 ANTIMICROBIAL INCISE DRAPE 6650EZ: Brand: IOBAN™ 2

## (undated) DEVICE — ANTIBACTERIAL UNDYED BRAIDED (POLYGLACTIN 910), SYNTHETIC ABSORBABLE SUTURE: Brand: COATED VICRYL

## (undated) DEVICE — SUT STRATAFIX SYMMTRC PDS+ 1 18IN CTX ABSRB V

## (undated) DEVICE — TRAP 4 CPTR CHMBR N EZ INLN

## (undated) DEVICE — GLOVE SUR 6.5 SENSICARE PI PIP CRM PWD F

## (undated) DEVICE — GAUZE,SPONGE,USP,4"X4",12PLY,STRL,10/PK: Brand: MEDLINE INDUSTRIES, INC.

## (undated) DEVICE — NEEDLE SPNL 18GA L3.5IN PNK QNCKE STYL DISP

## (undated) DEVICE — GLOVE SUR 7.5 SENSICARE PI PIP GRN PWD F

## (undated) DEVICE — STRYKER PERFORMANCE SERIES SAGITTAL BLADE: Brand: STRYKER PERFORMANCE SERIES

## (undated) DEVICE — BLADE ELECTRODE: Brand: EDGE

## (undated) DEVICE — ANTISEPTIC 4OZ 70% ISO ALC

## (undated) DEVICE — ANTERIOR HIP: Brand: MEDLINE INDUSTRIES, INC.

## (undated) DEVICE — SHEET,DRAPE,70X100,STERILE: Brand: MEDLINE

## (undated) DEVICE — GLOVE SUR 8 SENSICARE PI PIP CRM PWD F

## (undated) DEVICE — SNARE CAPTIFLEX MICRO-OVL OLY

## (undated) DEVICE — BIPOLAR SEALER 23-112-1 AQM 6.0: Brand: AQUAMANTYS ®

## (undated) DEVICE — Device: Brand: DEFENDO AIR/WATER/SUCTION AND BIOPSY VALVE

## (undated) NOTE — IP AVS SNAPSHOT
[facility] [place] [address] [place], [address] ~ [phone]                Discharge Summary   1/30/2017    Miriam Garcia           Admission Information        Provider Department    1/30/2017 Smiley Esqueda MD  [facility] Warfarin Sodium 2 MG Tabs   Commonly known as:  COUMADIN        Take 1 tablet (2mg) Mon and Fri, 2 tablets (4mg) 5-days weekly or as directed by Anticoagulation Clinic    Maritza Mccain     [    ]    [    ]    [    ]    [    ]            Where to Get You - If you don’t have insurance, [facility] has partnered with [facility] [address] to help you get signed up for insurance coverage.   [facility] [employer] [address] is a Federal Navigator program that can help with your Affordable Care Act cover temperature, rash, itching, shortness of breath, chills, nausea, and diarrhea           Blood Pressure and Cardiac Medications     digoxin (DIGITEK) 0.25 MG Oral Tab    Verapamil HCl  MG Oral Tab CR    Metoprolol Tartrate 100 MG Oral Tab         Use:

## (undated) NOTE — LETTER
[facility] Cardiac Device Communication Tool    Preop to complete    [facility] Phone: [phone] Fax: [phone]   Patient Name Chuck Goodrich   Patient  - AGE - SEX 1955 - A: 68 y - male   Surgical Date 2/15/2024   Surgical Procedure Cystolitholopaxy, transurethral resection of the prostate, possible suprapubic tube placement   Surgical Location [facility]   Type of cautery anticipated: Bipolar   Surgical procedure > 2 hours      Device Clinic to Complete the Information Below, Sign and Fax to [phone]   Pacemaker or ICD    Atrial or atrial-ventricular lead?        Indication for device    Is patient pacemaker dependent?    Has pt had routine f/u and is battery life > 3 months    Is ICD programmed to inhibit therapy w/magnet?    Does device have rate response or other sensor?      Surgical Procedure above Iliac Crest Surgical Procedure @ I[place]ac Crest and below   < 6 in. from ICD: Reprogram therapies OFF w/  asynchronous pacing if PM dependent  < 6 in. from PM: Reprogram asynchronous if PM   dependent ICD: No Change  PM: No Change   > 6 in. from ICD: Magnet*  > 6 in. from PM:  No Change*  * If PM dependent observe for pacing inhibition and minimize cautery if inhibition is seen                                              Bipolar cautery: No Change   Cardiac Device Management Plan (check one)            ___  Reprogram (PAT Dept to provide Rep w/ arrival date/time)   ___ Magnet    ___ No Change    Comments: ___________________________________________________________________        Signature: ________________________________ Date: ____________ Time: ______________     Print Name: ___________________________________

## (undated) NOTE — LETTER
[employer] Cardiac Device Communication Tool    Preop to complete    [facility], [facility] Phone: [phone] Fax: [phone]   Patient Name Chuck Goodrich   Patient  - AGE - SEX 1955 - A: 69 y - male   Surgical Date 2025   Surgical Procedure LEFT ANTERIOR TOTAL HIP ARTHROPLASTY   Surgical Location [facility]   Type of cautery anticipated: Monopolar   Surgical procedure > 2 hours      Device Clinic to Complete the Information Below, Sign and Fax to [phone]    Pacemaker or ICD    Atrial or atrial-ventricular lead?        Indication for device    Is patient pacemaker dependent?    Has pt had routine f/u and is battery life > 3 months    Is ICD programmed to inhibit therapy w/magnet?    Does device have rate response or other sensor?      Surgical Procedure above Iliac Crest Surgical Procedure @ Iliac Crest and below   < 6 in. from ICD: Reprogram therapies OFF w/  asynchronous pacing if PM dependent  < 6 in. from PM: Reprogram asynchronous if PM   dependent ICD: No Change  PM: No Change   > 6 in. from ICD: Magnet*  > 6 in. from PM:  No Change*  * If PM dependent observe for pacing inhibition and minimize cautery if inhibition is seen                                              Bipolar cautery: No Change   Cardiac Device Management Plan (check one)            ___  Reprogram (PAT Dept to provide Rep w/ arrival date/time)   ___ Magnet    ___ No Change    Comments: ___________________________________________________________________        Signature: ________________________________ Date: ____________ Time: ______________     Print Name: ___________________________________

## (undated) NOTE — IP AVS SNAPSHOT
[facility] [place] [address] [place], [address] ~ [phone]                Discharge Summary   1/19/2017    Winsome Cough           Admission Information        Provider Department    1/19/2017 Andrea Heaton MD  Endoscopy contact your Primary Care Physician. Activities:  - Take it easy today. Do not return to work today. - Do not drive today. - Do not operate any machinery today (including kitchen equipment).     Colonoscopy:  - You may notice some rectal \"spotting\" ( harming yourself, contact [facility] at [phone]. - If you don’t have insurance, [facility] has partnered with [facility] [address] to help you get signed up for insurance coverage.   [facility] [place]

## (undated) NOTE — ED AVS SNAPSHOT
Evi Powers   MRN: [id number]    Department:  [facility] Emergency Department   Date of Visit:  8/8/2017           Disclosure     Insurance plans vary and the physician(s) referred by the ER may not be covered by your plan.  Please contact y If you have been prescribed any medication(s), please fill your prescription right away and begin taking the medication(s) as directed    If the emergency physician has read X-rays, these will be re-interpreted by a radiologist.  If there is a significant